# Patient Record
Sex: MALE | Race: WHITE | NOT HISPANIC OR LATINO | Employment: FULL TIME | URBAN - METROPOLITAN AREA
[De-identification: names, ages, dates, MRNs, and addresses within clinical notes are randomized per-mention and may not be internally consistent; named-entity substitution may affect disease eponyms.]

---

## 2019-04-30 ENCOUNTER — OFFICE VISIT (OUTPATIENT)
Dept: GASTROENTEROLOGY | Facility: CLINIC | Age: 59
End: 2019-04-30
Payer: COMMERCIAL

## 2019-04-30 VITALS — SYSTOLIC BLOOD PRESSURE: 124 MMHG | DIASTOLIC BLOOD PRESSURE: 82 MMHG | WEIGHT: 247.38 LBS | HEART RATE: 72 BPM

## 2019-04-30 DIAGNOSIS — K59.1 FUNCTIONAL DIARRHEA: ICD-10-CM

## 2019-04-30 DIAGNOSIS — R10.84 GENERALIZED ABDOMINAL PAIN: ICD-10-CM

## 2019-04-30 DIAGNOSIS — K58.0 IRRITABLE BOWEL SYNDROME WITH DIARRHEA: Primary | ICD-10-CM

## 2019-04-30 PROBLEM — R19.7 DIARRHEA: Status: ACTIVE | Noted: 2019-04-30

## 2019-04-30 PROCEDURE — 99244 OFF/OP CNSLTJ NEW/EST MOD 40: CPT | Performed by: INTERNAL MEDICINE

## 2019-04-30 RX ORDER — ALBUTEROL SULFATE 90 UG/1
2 AEROSOL, METERED RESPIRATORY (INHALATION)
COMMUNITY
Start: 2017-12-04

## 2019-04-30 RX ORDER — MULTIVITAMIN
CAPSULE ORAL
COMMUNITY

## 2019-04-30 RX ORDER — DICYCLOMINE HCL 20 MG
20 TABLET ORAL 3 TIMES DAILY PRN
Qty: 60 TABLET | Refills: 0 | Status: SHIPPED | OUTPATIENT
Start: 2019-04-30

## 2019-05-06 ENCOUNTER — TELEPHONE (OUTPATIENT)
Dept: GASTROENTEROLOGY | Facility: CLINIC | Age: 59
End: 2019-05-06

## 2019-05-19 ENCOUNTER — ANESTHESIA EVENT (OUTPATIENT)
Dept: GASTROENTEROLOGY | Facility: HOSPITAL | Age: 59
End: 2019-05-19

## 2019-05-20 ENCOUNTER — ANESTHESIA (OUTPATIENT)
Dept: GASTROENTEROLOGY | Facility: HOSPITAL | Age: 59
End: 2019-05-20

## 2019-05-20 ENCOUNTER — HOSPITAL ENCOUNTER (OUTPATIENT)
Dept: GASTROENTEROLOGY | Facility: HOSPITAL | Age: 59
Setting detail: OUTPATIENT SURGERY
Discharge: HOME | End: 2019-05-20
Attending: INTERNAL MEDICINE
Payer: COMMERCIAL

## 2019-05-20 VITALS
WEIGHT: 238.98 LBS | OXYGEN SATURATION: 96 % | SYSTOLIC BLOOD PRESSURE: 104 MMHG | RESPIRATION RATE: 19 BRPM | HEART RATE: 75 BPM | TEMPERATURE: 98.1 F | DIASTOLIC BLOOD PRESSURE: 74 MMHG | HEIGHT: 74 IN | BODY MASS INDEX: 30.67 KG/M2

## 2019-05-20 DIAGNOSIS — R10.84 GENERALIZED ABDOMINAL PAIN: ICD-10-CM

## 2019-05-20 DIAGNOSIS — K59.1 FUNCTIONAL DIARRHEA: ICD-10-CM

## 2019-05-20 DIAGNOSIS — K58.0 IRRITABLE BOWEL SYNDROME WITH DIARRHEA: ICD-10-CM

## 2019-05-20 PROCEDURE — 43239 EGD BIOPSY SINGLE/MULTIPLE: CPT | Performed by: INTERNAL MEDICINE

## 2019-05-20 PROCEDURE — 88305 TISSUE EXAM BY PATHOLOGIST: CPT | Performed by: PATHOLOGY

## 2019-05-20 PROCEDURE — 45380 COLONOSCOPY AND BIOPSY: CPT | Performed by: INTERNAL MEDICINE

## 2019-05-20 PROCEDURE — 45385 COLONOSCOPY W/LESION REMOVAL: CPT | Performed by: INTERNAL MEDICINE

## 2019-05-20 RX ORDER — SODIUM CHLORIDE, SODIUM LACTATE, POTASSIUM CHLORIDE, CALCIUM CHLORIDE 600; 310; 30; 20 MG/100ML; MG/100ML; MG/100ML; MG/100ML
125 INJECTION, SOLUTION INTRAVENOUS CONTINUOUS
Status: DISCONTINUED | OUTPATIENT
Start: 2019-05-20 | End: 2019-05-24 | Stop reason: HOSPADM

## 2019-05-20 RX ADMIN — LIDOCAINE HYDROCHLORIDE 20 MG: 20 INJECTION, SOLUTION INTRAVENOUS at 10:30

## 2019-05-20 RX ADMIN — LIDOCAINE HYDROCHLORIDE 20 MG: 20 INJECTION, SOLUTION INTRAVENOUS at 10:21

## 2019-05-20 RX ADMIN — LIDOCAINE HYDROCHLORIDE 30 MG: 20 INJECTION, SOLUTION INTRAVENOUS at 10:28

## 2019-05-20 RX ADMIN — LIDOCAINE HYDROCHLORIDE 130 MG: 20 INJECTION, SOLUTION INTRAVENOUS at 10:17

## 2019-05-20 RX ADMIN — SODIUM CHLORIDE, SODIUM LACTATE, POTASSIUM CHLORIDE, AND CALCIUM CHLORIDE 125 ML/HR: .6; .31; .03; .02 INJECTION, SOLUTION INTRAVENOUS at 09:44

## 2019-05-20 RX ADMIN — LIDOCAINE HYDROCHLORIDE 30 MG: 20 INJECTION, SOLUTION INTRAVENOUS at 10:25

## 2019-05-20 RX ADMIN — LIDOCAINE HYDROCHLORIDE 20 MG: 20 INJECTION, SOLUTION INTRAVENOUS at 10:22

## 2019-05-23 ENCOUNTER — TELEPHONE (OUTPATIENT)
Dept: GASTROENTEROLOGY | Facility: CLINIC | Age: 59
End: 2019-05-23

## 2019-07-03 RX ORDER — SILDENAFIL CITRATE 20 MG/1
TABLET ORAL
Refills: 5 | COMMUNITY
Start: 2019-05-17 | End: 2021-04-19 | Stop reason: ALTCHOICE

## 2019-07-08 ENCOUNTER — OFFICE VISIT (OUTPATIENT)
Dept: GASTROENTEROLOGY | Facility: CLINIC | Age: 59
End: 2019-07-08
Payer: COMMERCIAL

## 2019-07-08 VITALS
HEART RATE: 64 BPM | SYSTOLIC BLOOD PRESSURE: 122 MMHG | DIASTOLIC BLOOD PRESSURE: 84 MMHG | BODY MASS INDEX: 32 KG/M2 | WEIGHT: 249.25 LBS

## 2019-07-08 DIAGNOSIS — R10.12 LUQ PAIN: ICD-10-CM

## 2019-07-08 DIAGNOSIS — K58.2 IRRITABLE BOWEL SYNDROME WITH BOTH CONSTIPATION AND DIARRHEA: Primary | ICD-10-CM

## 2019-07-08 PROCEDURE — 99213 OFFICE O/P EST LOW 20 MIN: CPT | Performed by: PHYSICIAN ASSISTANT

## 2019-07-08 NOTE — PROGRESS NOTES
[facility] - Outpatient Follow-up Note  Camilo Mantel Apgar 64 y o  male MRN: [id number]  Encounter: [id number]          ASSESSMENT AND PLAN:      1  IBS with Alternating Constipation/Diarrhea  2  LUQ Pain  - S/P EGD/colonoscopy which were relatively normal except for diverticulosis and 1 tubular adenoma which was removed  - Recall colonoscopy in 3 years  - S/P xifaxan course, change in diet with increasing water/fiber intake, probiotic daily and his symptoms are significantly improved  - No alarm symptoms such as unintentional weight loss, BRBPR, fever, nausea, vomiting  - Bentyl PRN, pt rarely has to use this and reports his LUQ pain is very occasional and mild now  - Follow up in 2-3 months to ensure he has continued improvement, continue current regimen as above  ______________________________________________________________________    SUBJECTIVE:  Mr Apgar is a 65 yo M with a PMH of Afib s/p ablation, presenting for follow-up after his endoscopy and colonoscopy  He was initially seen at the end of April for several years but recent worsening of periumbilical and left upper quadrant pain associated with alternating constipation and diarrhea  His endoscopy and colonoscopy relatively normal except for diverticulosis which was mild, and 1 polyp which was a tubular adenoma  The biopsies taken were negative for celiac disease, H pylori  He is given a Xifaxan course and he altered his diet with drinking more water, reducing red meat intake, and eating more fruits and vegetables and fibrous foods  He is taking a probiotic every day  There is a fiber supplement that he tries to take but is not as consistent with this  He feels significantly better  He denies any further diarrhea  He also had periods softer stools alternating with mild constipation but overall things are much better and he is much happier  There is no unintentional weight loss     He has only use the dicyclomine handful of times and reports this does help the left upper quadrant pain he will get but this is very mild and happens very occasionally now  REVIEW OF SYSTEMS IS OTHERWISE NEGATIVE  Historical Information   Past Medical History:   Diagnosis Date    Atrial fibrillation ([address] )     H/O cardiac radiofrequency ablation      Past Surgical History:   Procedure Laterality Date    APPENDECTOMY      COLONOSCOPY      TONSILLECTOMY       Social History   Social History     Substance and Sexual Activity   Alcohol Use Yes    Frequency: Monthly or less    Drinks per session: 1 or 2    Binge frequency: Never     Social History     Substance and Sexual Activity   Drug Use Never     Social History     Tobacco Use   Smoking Status Never Smoker   Smokeless Tobacco Never Used     Family History   Problem Relation Age of Onset    Cancer Mother     Cancer Father     Diabetes Maternal Grandmother     Cancer Maternal Grandmother     Diabetes Paternal Grandmother     Cancer Paternal Grandmother     Cancer Paternal Grandfather        Meds/Allergies       Current Outpatient Medications:     albuterol (PROAIR HFA) 90 mcg/act inhaler    aspirin 81 MG tablet    dicyclomine (BENTYL) 20 mg tablet    Multiple Vitamin (MULTIVITAMIN) capsule    sildenafil (REVATIO) 20 mg tablet    No Known Allergies        Objective     Blood pressure 122/84, pulse 64, weight 113 kg (249 lb 4 oz)  Body mass index is 32 kg/m²  PHYSICAL EXAM:      General Appearance:   Alert, cooperative, no distress   HEENT:   Normocephalic, atraumatic, anicteric      Neck:  Supple, symmetrical, trachea midline   Lungs:   Clear to auscultation bilaterally; no rales, rhonchi or wheezing; respirations unlabored    Heart[de-identified]   Regular rate and rhythm; no murmur, rub, or gallop     Abdomen:   Soft, non-tender, non-distended; normal bowel sounds; no masses, no organomegaly    Genitalia:   Deferred    Rectal:   Deferred    Extremities:  No cyanosis, clubbing or edema  Pulses:  2+ and symmetric    Skin:  No jaundice, rashes, or lesions    Lymph nodes:  No palpable cervical lymphadenopathy        Lab Results:   No visits with results within 1 Day(s) from this visit  Latest known visit with results is:   Hospital Outpatient Visit on 05/20/2019   Component Date Value    Case Report 05/20/2019                      Value:Surgical Pathology Report                         Case: [id number]                                   Authorizing Provider:  Rafael Edmondson MD   Collected:           05/20/2019 1020              Ordering Location:      [address] [place] [address]       Received:            05/20/2019 [facility]                                                             Pathologist:           Tasha Hector MD                                                          Specimens:   A) - Duodenum                                                                                       B) - Stomach, stomach                                                                               C) - Large Intestine, Right/Ascending Colon                                                         D) - Polyp, Colorectal                                                                     Final Diagnosis 05/20/2019                      Value: This result contains rich text formatting which cannot be displayed here   Additional Information 05/20/2019                      Value: This result contains rich text formatting which cannot be displayed here   Synoptic Checklist 05/20/2019                      Value:  (COLON/RECTUM POLYP FORM-GI - All Specimens)                                                                                                                 : Adenoma(s)     Arvie Factor Description 05/20/2019                      Value: This result contains rich text formatting which cannot be displayed here      Clinical Information 05/20/2019 Value:Cold bx  R/o celiac disease    Case Report 05/20/2019                      Value:Surgical Pathology Report                         Case: [id number]                                   Authorizing Provider:  Jo Alexis MD   Collected:           05/20/2019 1020              Ordering Location:      [facility]       Received:            05/20/2019 [facility] Endoscopy                                                             Pathologist:           Meena Larkin MD                                                          Specimens:   A) - Duodenum                                                                                       B) - Stomach, stomach                                                                               C) - Large Intestine, Right/Ascending Colon                                                         D) - Polyp, Colorectal                                                                     Final Diagnosis 05/20/2019                      Value: This result contains rich text formatting which cannot be displayed here   Additional Information 05/20/2019                      Value: This result contains rich text formatting which cannot be displayed here   Synoptic Checklist 05/20/2019                      Value:  (COLON/RECTUM POLYP FORM-GI - All Specimens)                                                                                                                 : Adenoma(s)     Andrew Marie Description 05/20/2019                      Value: This result contains rich text formatting which cannot be displayed here   Clinical Information 05/20/2019                      Value:Cold bx  R/o celiac disease         Radiology Results:   No results found

## 2020-11-19 ENCOUNTER — OFFICE VISIT (OUTPATIENT)
Dept: UROLOGY | Facility: CLINIC | Age: 60
End: 2020-11-19
Payer: COMMERCIAL

## 2020-11-19 VITALS
SYSTOLIC BLOOD PRESSURE: 142 MMHG | RESPIRATION RATE: 18 BRPM | WEIGHT: 250 LBS | HEART RATE: 86 BPM | TEMPERATURE: 97.4 F | DIASTOLIC BLOOD PRESSURE: 74 MMHG | HEIGHT: 74 IN | BODY MASS INDEX: 32.08 KG/M2

## 2020-11-19 DIAGNOSIS — Z12.5 SCREENING FOR PROSTATE CANCER: ICD-10-CM

## 2020-11-19 DIAGNOSIS — R35.0 FREQUENCY OF URINATION: Primary | ICD-10-CM

## 2020-11-19 LAB
BACTERIA UR QL AUTO: NORMAL /HPF
BILIRUB UR QL STRIP: NEGATIVE
CLARITY UR: CLEAR
COLOR UR: YELLOW
GLUCOSE UR STRIP-MCNC: NEGATIVE MG/DL
HGB UR QL STRIP.AUTO: NEGATIVE
HYALINE CASTS #/AREA URNS LPF: NORMAL /LPF
KETONES UR STRIP-MCNC: NEGATIVE MG/DL
LEUKOCYTE ESTERASE UR QL STRIP: NEGATIVE
NITRITE UR QL STRIP: NEGATIVE
NON-SQ EPI CELLS URNS QL MICRO: NORMAL /HPF
PH UR STRIP.AUTO: 5.5 [PH]
POST-VOID RESIDUAL VOLUME, ML POC: 6 ML
PROT UR STRIP-MCNC: NEGATIVE MG/DL
RBC #/AREA URNS AUTO: NORMAL /HPF
SL AMB  POCT GLUCOSE, UA: NORMAL
SL AMB LEUKOCYTE ESTERASE,UA: NORMAL
SL AMB POCT BILIRUBIN,UA: NORMAL
SL AMB POCT BLOOD,UA: NORMAL
SL AMB POCT CLARITY,UA: CLEAR
SL AMB POCT COLOR,UA: YELLOW
SL AMB POCT KETONES,UA: NORMAL
SL AMB POCT NITRITE,UA: NORMAL
SL AMB POCT PH,UA: 5
SL AMB POCT SPECIFIC GRAVITY,UA: 1.01
SL AMB POCT URINE PROTEIN: NORMAL
SL AMB POCT UROBILINOGEN: 0.2
SP GR UR STRIP.AUTO: 1.02 (ref 1–1.03)
UROBILINOGEN UR QL STRIP.AUTO: 0.2 E.U./DL
WBC #/AREA URNS AUTO: NORMAL /HPF

## 2020-11-19 PROCEDURE — 99203 OFFICE O/P NEW LOW 30 MIN: CPT | Performed by: PHYSICIAN ASSISTANT

## 2020-11-19 PROCEDURE — 51798 US URINE CAPACITY MEASURE: CPT | Performed by: PHYSICIAN ASSISTANT

## 2020-11-19 PROCEDURE — 81001 URINALYSIS AUTO W/SCOPE: CPT | Performed by: PHYSICIAN ASSISTANT

## 2020-11-19 PROCEDURE — 81002 URINALYSIS NONAUTO W/O SCOPE: CPT | Performed by: PHYSICIAN ASSISTANT

## 2020-11-19 RX ORDER — ALLOPURINOL 100 MG/1
100 TABLET ORAL
COMMUNITY
Start: 2020-09-18

## 2020-11-19 RX ORDER — TAMSULOSIN HYDROCHLORIDE 0.4 MG/1
0.4 CAPSULE ORAL
Qty: 30 CAPSULE | Refills: 3 | Status: SHIPPED | OUTPATIENT
Start: 2020-11-19 | End: 2021-04-19 | Stop reason: SDUPTHER

## 2020-11-19 RX ORDER — PREDNISONE 10 MG/1
TABLET ORAL
COMMUNITY
Start: 2020-09-15

## 2021-02-22 ENCOUNTER — TELEPHONE (OUTPATIENT)
Dept: UROLOGY | Facility: CLINIC | Age: 61
End: 2021-02-22

## 2021-02-22 NOTE — TELEPHONE ENCOUNTER
Patient was scheduled to see La strickland on 02/25/2021 for a 3 month follow up with PSA prior to visit  Called to remind patient to get blood work done and patient asked if appointment can be rescheduled  Offered patient a virtual visit and he declined  Please get patient rescheduled  Thanks!

## 2021-04-13 ENCOUNTER — APPOINTMENT (OUTPATIENT)
Dept: LAB | Facility: CLINIC | Age: 61
End: 2021-04-13
Payer: COMMERCIAL

## 2021-04-13 ENCOUNTER — TRANSCRIBE ORDERS (OUTPATIENT)
Dept: LAB | Facility: CLINIC | Age: 61
End: 2021-04-13

## 2021-04-13 DIAGNOSIS — Z23 ENCOUNTER FOR IMMUNIZATION: ICD-10-CM

## 2021-04-13 DIAGNOSIS — Z12.5 SCREENING FOR PROSTATE CANCER: ICD-10-CM

## 2021-04-13 LAB — PSA SERPL-MCNC: 3.8 NG/ML (ref 0–4)

## 2021-04-13 PROCEDURE — 36415 COLL VENOUS BLD VENIPUNCTURE: CPT

## 2021-04-13 PROCEDURE — G0103 PSA SCREENING: HCPCS

## 2021-04-16 NOTE — PROGRESS NOTES
2021      Chief Complaint   Patient presents with    Urinary Frequency     Assessment and Plan    63 y o  male    1  BPH with LUTS  - Improved on Flomax, rx refilled  - AUA=11   - PVR=0 mL  - Uroflow: Peak flow=4 0, Average flow=2 9, Voided Volume=54 mL  - Consider cystoscopy and TRUS evaluation for surgical considerations  2  Prostate cancer screening  - Most recent PSA was 3 8 on 21  Previously 2 65 in 2018    - JEREMIAH unremarkable  - Given that this PSA is slightly elevated for patient's age range, would recommend repeating in 2 weeks  - If persistent elevation, may consider prostate biopsy  History of Present Illness  Kathyleen Flattery Apgar is a 63 y o  male here for follow up evaluation of BPH with LUTS and prostate cancer screening  Patient was started on Flomax at last office visit  He reports improvement in urinary symptoms  In regards to prostate cancer screening most recent PSA from 2021 was 3 8  Previously  PSA was 2 65 in 2018  Denies family history of prostate or urologic cancers  AUA SYMPTOM SCORE      Most Recent Value   AUA SYMPTOM SCORE   How often have you had a sensation of not emptying your bladder completely after you finished urinating? 3   How often have you had to urinate again less than two hours after you finished urinating? 2   How often have you found you stopped and started again several times when you urinate?  0   How often have you found it difficult to postpone urination? 1   How often have you had a weak urinary stream?  1   How often have you had to push or strain to begin urination? 1   How many times did you most typically get up to urinate from the time you went to bed at night until the time you got up in the morning?   3   Quality of Life: If you were to spend the rest of your life with your urinary condition just the way it is now, how would you feel about that?  3   AUA SYMPTOM SCORE  11          Review of Systems   Constitutional: Negative for chills and fever  Respiratory: Negative for shortness of breath  Cardiovascular: Negative for chest pain  Gastrointestinal: Negative for abdominal pain, constipation, diarrhea, nausea and vomiting  Genitourinary: Positive for frequency  Negative for difficulty urinating, dysuria, flank pain, hematuria and urgency  Allergic/Immunologic: Negative for immunocompromised state  Neurological: Negative for dizziness  Hematological: Does not bruise/bleed easily                    Past Medical History  Past Medical History:   Diagnosis Date    Atrial fibrillation [address] [address] )     H/O cardiac radiofrequency ablation        Past Social History  Past Surgical History:   Procedure Laterality Date    APPENDECTOMY      COLONOSCOPY      TONSILLECTOMY       Social History     Tobacco Use   Smoking Status Never Smoker   Smokeless Tobacco Never Used       Past Family History  Family History   Problem Relation Age of Onset    Cancer Mother     Cancer Father     Diabetes Maternal Grandmother     Cancer Maternal Grandmother     Diabetes Paternal Grandmother     Cancer Paternal Grandmother     Cancer Paternal Grandfather        Past Social history  Social History     Socioeconomic History    Marital status: /Civil Union     Spouse name: Not on file    Number of children: Not on file    Years of education: Not on file    Highest education level: Not on file   Occupational History    Not on file   Social Needs    Financial resource strain: Not on file    Food insecurity     Worry: Not on file     Inability: Not on file   [employer] needs     Medical: Not on file     Non-medical: Not on file   Tobacco Use    Smoking status: Never Smoker    Smokeless tobacco: Never Used   Substance and Sexual Activity    Alcohol use: Yes     Frequency: Monthly or less     Drinks per session: 1 or 2     Binge frequency: Never    Drug use: Never    Sexual activity: Not on file   Lifestyle    Physical activity Days per week: Not on file     Minutes per session: Not on file    Stress: Not on file   Relationships    Social connections     Talks on phone: Not on file     Gets together: Not on file     Attends Worship service: Not on file     Active member of club or organization: Not on file     Attends meetings of clubs or organizations: Not on file     Relationship status: Not on file    Intimate partner violence     Fear of current or ex partner: Not on file     Emotionally abused: Not on file     Physically abused: Not on file     Forced sexual activity: Not on file   Other Topics Concern    Not on file   Social History Narrative    Not on file       Current Medications  Current Outpatient Medications   Medication Sig Dispense Refill    albuterol (PROAIR HFA) 90 mcg/act inhaler Inhale 2 puffs      allopurinol (ZYLOPRIM) 100 mg tablet Take 100 mg by mouth      aspirin 81 MG tablet Take by mouth      Multiple Vitamin (MULTIVITAMIN) capsule Take by mouth      tamsulosin (FLOMAX) 0 4 mg Take 1 capsule (0 4 mg total) by mouth daily with dinner 30 capsule 3    dicyclomine (BENTYL) 20 mg tablet Take 1 tablet (20 mg total) by mouth 3 (three) times a day as needed (abdominal pain) 60 tablet 0    predniSONE 10 mg tablet Take 4 tabs for 2 days, 3 tabs for 2 days, 2 tabs for 2 days 1 tab for 2 days       No current facility-administered medications for this visit  Allergies  No Known Allergies      The following portions of the patient's history were reviewed and updated as appropriate: allergies, current medications, past medical history, past social history, past surgical history and problem list       Vitals  Vitals:    04/19/21 0926   BP: 126/78   Pulse: 82   Weight: 114 kg (251 lb 12 8 oz)   Height: 6' 2" (1 88 m)           Physical Exam  Physical Exam  Constitutional:       Appearance: Normal appearance  HENT:      Head: Normocephalic and atraumatic        Right Ear: External ear normal       Left Ear: External ear normal    Eyes:      General: No scleral icterus  Conjunctiva/sclera: Conjunctivae normal    Neck:      Musculoskeletal: Normal range of motion  Pulmonary:      Effort: Pulmonary effort is normal    Genitourinary:     Comments: Prostate approximately 30 g  Smooth, firm, symmetric without nodules or tenderness  Musculoskeletal: Normal range of motion  Skin:     General: Skin is warm and dry  Neurological:      General: No focal deficit present  Mental Status: He is alert and oriented to person, place, and time  Psychiatric:         Mood and Affect: Mood normal          Behavior: Behavior normal          Thought Content:  Thought content normal          Judgment: Judgment normal            Results  Recent Results (from the past 1 hour(s))   POCT Measure PVR    Collection Time: 04/19/21  9:29 AM   Result Value Ref Range    POST-VOID RESIDUAL VOLUME, ML POC 0 mL   ]  Lab Results   Component Value Date    PSA 3 8 04/13/2021     No results found for: GLUCOSE, CALCIUM, NA, K, CO2, CL, BUN, CREATININE  No results found for: WBC, HGB, HCT, MCV, PLT        Orders  Orders Placed This Encounter   Procedures    PSA, total and free     Standing Status:   Future     Standing Expiration Date:   4/19/2022    POCT Measure PVR       Dennis Bailey PA-C

## 2021-04-19 ENCOUNTER — OFFICE VISIT (OUTPATIENT)
Dept: UROLOGY | Facility: CLINIC | Age: 61
End: 2021-04-19
Payer: COMMERCIAL

## 2021-04-19 VITALS
WEIGHT: 251.8 LBS | HEIGHT: 74 IN | BODY MASS INDEX: 32.31 KG/M2 | HEART RATE: 82 BPM | DIASTOLIC BLOOD PRESSURE: 78 MMHG | SYSTOLIC BLOOD PRESSURE: 126 MMHG

## 2021-04-19 DIAGNOSIS — R35.0 FREQUENCY OF URINATION: Primary | ICD-10-CM

## 2021-04-19 DIAGNOSIS — R35.0 BENIGN PROSTATIC HYPERPLASIA WITH URINARY FREQUENCY: ICD-10-CM

## 2021-04-19 DIAGNOSIS — R97.20 ELEVATED PSA: ICD-10-CM

## 2021-04-19 DIAGNOSIS — N40.1 BENIGN PROSTATIC HYPERPLASIA WITH URINARY FREQUENCY: ICD-10-CM

## 2021-04-19 LAB — POST-VOID RESIDUAL VOLUME, ML POC: 0 ML

## 2021-04-19 PROCEDURE — 51798 US URINE CAPACITY MEASURE: CPT | Performed by: PHYSICIAN ASSISTANT

## 2021-04-19 PROCEDURE — 1036F TOBACCO NON-USER: CPT | Performed by: PHYSICIAN ASSISTANT

## 2021-04-19 PROCEDURE — 3008F BODY MASS INDEX DOCD: CPT | Performed by: PHYSICIAN ASSISTANT

## 2021-04-19 PROCEDURE — 99213 OFFICE O/P EST LOW 20 MIN: CPT | Performed by: PHYSICIAN ASSISTANT

## 2021-04-19 RX ORDER — TAMSULOSIN HYDROCHLORIDE 0.4 MG/1
0.4 CAPSULE ORAL
Qty: 30 CAPSULE | Refills: 3 | Status: SHIPPED | OUTPATIENT
Start: 2021-04-19 | End: 2022-04-14

## 2021-05-01 ENCOUNTER — IMMUNIZATIONS (OUTPATIENT)
Dept: FAMILY MEDICINE CLINIC | Facility: HOSPITAL | Age: 61
End: 2021-05-01

## 2021-05-01 DIAGNOSIS — Z23 ENCOUNTER FOR IMMUNIZATION: Primary | ICD-10-CM

## 2021-05-01 PROCEDURE — 91300 SARS-COV-2 / COVID-19 MRNA VACCINE (PFIZER-BIONTECH) 30 MCG: CPT

## 2021-05-01 PROCEDURE — 0001A SARS-COV-2 / COVID-19 MRNA VACCINE (PFIZER-BIONTECH) 30 MCG: CPT

## 2021-05-24 ENCOUNTER — IMMUNIZATIONS (OUTPATIENT)
Dept: FAMILY MEDICINE CLINIC | Facility: HOSPITAL | Age: 61
End: 2021-05-24

## 2021-05-24 DIAGNOSIS — Z23 ENCOUNTER FOR IMMUNIZATION: Primary | ICD-10-CM

## 2021-05-24 PROCEDURE — 0002A SARS-COV-2 / COVID-19 MRNA VACCINE (PFIZER-BIONTECH) 30 MCG: CPT

## 2021-05-24 PROCEDURE — 91300 SARS-COV-2 / COVID-19 MRNA VACCINE (PFIZER-BIONTECH) 30 MCG: CPT

## 2022-01-14 ENCOUNTER — IMMUNIZATIONS (OUTPATIENT)
Dept: FAMILY MEDICINE CLINIC | Facility: HOSPITAL | Age: 62
End: 2022-01-14

## 2022-01-14 DIAGNOSIS — Z23 ENCOUNTER FOR IMMUNIZATION: Primary | ICD-10-CM

## 2022-01-14 PROCEDURE — 0001A COVID-19 PFIZER VACC 0.3 ML: CPT

## 2022-01-14 PROCEDURE — 91300 COVID-19 PFIZER VACC 0.3 ML: CPT

## 2022-04-14 DIAGNOSIS — R35.0 BENIGN PROSTATIC HYPERPLASIA WITH URINARY FREQUENCY: ICD-10-CM

## 2022-04-14 DIAGNOSIS — N40.1 BENIGN PROSTATIC HYPERPLASIA WITH URINARY FREQUENCY: ICD-10-CM

## 2022-04-14 RX ORDER — TAMSULOSIN HYDROCHLORIDE 0.4 MG/1
CAPSULE ORAL
Qty: 30 CAPSULE | Refills: 3 | Status: SHIPPED | OUTPATIENT
Start: 2022-04-14

## 2022-08-30 ENCOUNTER — TELEPHONE (OUTPATIENT)
Dept: GASTROENTEROLOGY | Facility: CLINIC | Age: 62
End: 2022-08-30

## 2022-11-10 ENCOUNTER — OFFICE VISIT (OUTPATIENT)
Dept: DERMATOLOGY | Facility: CLINIC | Age: 62
End: 2022-11-10

## 2022-11-10 DIAGNOSIS — D48.9 NEOPLASM OF UNCERTAIN BEHAVIOR: Primary | ICD-10-CM

## 2022-11-10 NOTE — PROGRESS NOTES
[facility] [address] [facility]     Patient Name: Cyrus Jenkins Apgar  Encounter Date: 11/10/2022    Have you been cared for by a [place] [address] Dermatologist in the last 3 years and, if so, which description applies to you? NO  I am considered a "new" patient and must complete all patient intake questions  I am MALE/not capable of bearing children  REVIEW OF SYSTEMS:  Have you recently had or currently have any of the following? · Recent fever or chills? No  · Any non-healing wound? YES,    PAST MEDICAL HISTORY:  Have you personally ever had or currently have any of the following? If "YES," then please provide more detail  · Skin cancer (such as Melanoma, Basal Cell Carcinoma, Squamous Cell Carcinoma? No  · Tuberculosis, HIV/AIDS, Hepatitis B or C: No  · Systemic Immunosuppression such as Diabetes, Biologic or Immunotherapy, Chemotherapy, Organ Transplantation, Bone Marrow Transplantation No  · Radiation Treatment No   FAMILY HISTORY:  Any "first degree relatives" (parent, brother, sister, or child) with the following? • Skin Cancer, Pancreatic or Other Cancer? No   PATIENT EXPERIENCE:    • Do you want the Dermatologist to perform a COMPLETE skin exam today including a clinical examination under the "bra and underwear" areas? NO  • If necessary, do we have your permission to call and leave a detailed message on your Preferred Phone number that includes your specific medical information?   Yes      No Known Allergies   Current Outpatient Medications:   •  albuterol (PROVENTIL HFA,VENTOLIN HFA) 90 mcg/act inhaler, Inhale 2 puffs, Disp: , Rfl:   •  allopurinol (ZYLOPRIM) 100 mg tablet, Take 100 mg by mouth, Disp: , Rfl:   •  Multiple Vitamin (MULTIVITAMIN) capsule, Take by mouth, Disp: , Rfl:   •  tamsulosin (FLOMAX) 0 4 mg, TAKE 1 CAPSULE BY MOUTH EVERY DAY WITH DINNER, Disp: 30 capsule, Rfl: 3  •  aspirin 81 MG tablet, Take by mouth, Disp: , Rfl:   •  dicyclomine (BENTYL) 20 mg tablet, Take 1 tablet (20 mg total) by mouth 3 (three) times a day as needed (abdominal pain), Disp: 60 tablet, Rfl: 0  •  predniSONE 10 mg tablet, Take 4 tabs for 2 days, 3 tabs for 2 days, 2 tabs for 2 days 1 tab for 2 days, Disp: , Rfl:           • Whom besides the patient is providing clinical information about today's encounter?   o NO ADDITIONAL HISTORIAN (patient alone provided history)    Physical Exam and Assessment/Plan by Diagnosis:    NEOPLASM OF UNCERTAIN BEHAVIOR OF SKIN    Physical Exam:  • (Anatomic Location); (Size and Morphological Description); (Differential Diagnosis):  o Left cheek; 1 2 cm crusted pink plaque; diffdx; basal cell   • Pertinent Positives:  • Pertinent Negatives: Additional History of Present Condition:  Patient had got stuck with a branch about 1 year ago lesion never healed and started growing  Assessment and Plan:  • I have discussed with the patient that a sample of skin via a "skin biopsy” would be potentially helpful to further make a specific diagnosis under the microscope  • Based on a thorough discussion of this condition and the management approach to it (including a comprehensive discussion of the known risks, side effects and potential benefits of treatment), the patient (family) agrees to implement the following specific plan:    o Procedure:  Skin Biopsy  After a thorough discussion of treatment options and risk/benefits/alternatives (including but not limited to local pain, scarring, dyspigmentation, blistering, possible superinfection, and inability to confirm a diagnosis via histopathology), verbal and written consent were obtained and portion of the rash was biopsied for tissue sample  See below for consent that was obtained from patient and subsequent Procedure Note    PROCEDURE TANGENTIAL (SHAVE) BIOPSY NOTE:    • Performing Physician: Pat Del Rosario   • Anatomic Location; Clinical Description with size (cm); Pre-Op Diagnosis:   o Left cheek; 1 2 cm crusted pink plaque; diffdx; basal cell   • Post-op diagnosis: Same     • Local anesthesia: 1% Lidocaine HCL     • Topical anesthesia: None    • Hemostasis: Electrocautery       After obtaining informed consent  at which time there was a discussion about the purpose of biopsy  and low risks of infection and bleeding  The area was prepped and draped in the usual fashion  Anesthesia was obtained with 1% lidocaine with epinephrine  A shave biopsy to an appropriate sampling depth was obtained by Shave (Dermablade or 15 blade) The resulting wound was covered with surgical ointment and bandaged appropriately  The patient tolerated the procedure well without complications and was without signs of functional compromise  Specimen has been sent for review by Dermatopathology  Standard post-procedure care has been explained and has been included in written form within the patient's copy of Informed Consent  INFORMED CONSENT DISCUSSION AND POST-OPERATIVE INSTRUCTIONS FOR PATIENT    I   RATIONALE FOR PROCEDURE  I understand that a skin biopsy allows the Dermatologist to test a lesion or rash under the microscope to obtain a diagnosis  It usually involves numbing the area with numbing medication and removing a small piece of skin; sometimes the area will be closed with sutures  In this specific procedure, sutures are not usually needed  If any sutures are placed, then they are usually need to be removed in 2 weeks or less  I understand that my Dermatologist recommends that a skin "shave" biopsy be performed today  A local anesthetic, similar to the kind that a dentist uses when filling a cavity, will be injected with a very small needle into the skin area to be sampled  The injected skin and tissue underneath "will go to sleep” and become numb so no pain should be felt afterwards    An instrument shaped like a tiny "razor blade" (shave biopsy instrument) will be used to cut a small piece of tissue and skin from the area so that a sample of tissue can be taken and examined more closely under the microscope  A slight amount of bleeding will occur, but it will be stopped with direct pressure and a pressure bandage and any other appropriate methods  I understands that a scar will form where the wound was created  Surgical ointment will be applied to help protect the wound  Sutures are not usually needed  II   RISKS AND POTENTIAL COMPLICATIONS   I understand the risks and potential complications of a skin biopsy include but are not limited to the following:  • Bleeding  • Infection  • Pain  • Scar/keloid  • Skin discoloration  • Incomplete Removal  • Recurrence  • Nerve Damage/Numbness/Loss of Function  • Allergic Reaction to Anesthesia  • Biopsies are diagnostic procedures and based on findings additional treatment or evaluation may be required  • Loss or destruction of specimen resulting in no additional findings    My Dermatologist has explained to me the nature of the condition, the nature of the procedure, and the benefits to be reasonably expected compared with alternative approaches  My Dermatologist has discussed the likelihood of major risks or complications of this procedure including the specific risks listed above, such as bleeding, infection, and scarring/keloid  I understand that a scar is expected after this procedure  I understand that my physician cannot predict if the scar will form a "keloid," which extends beyond the borders of the wound that is created  A keloid is a thick, painful, and bumpy scar  A keloid can be difficult to treat, as it does not always respond well to therapy, which includes injecting cortisone directly into the keloid every few weeks  While this usually reduces the pain and size of the scar, it does not eliminate it  I understand that photographs may be taken before and after the procedure    These will be maintained as part of the medical providers confidential records and may not be made available to me  I further authorize the medical provider to use the photographs for teaching purposes or to illustrate scientific papers, books, or lectures if in his/her judgment, medical research, education, or science may benefit from its use  I have had an opportunity to fully inquire about the risks and benefits of this procedure and its alternatives  I have been given ample time and opportunity to ask questions and to seek a second opinion if I wished to do so  I acknowledge that there have specifically been no guarantees as to the cosmetic results from the procedure  I am aware that with any procedure there is always the possibility of an unexpected complication  III  POST-PROCEDURAL CARE (WHAT YOU WILL NEED TO DO "AFTER THE BIOPSY" TO OPTIMIZE HEALING)    • Keep the area clean and dry  Try NOT to remove the bandage or get it wet for the first 24 hours  • Gently clean the area and apply surgical ointment (such as Vaseline petrolatum ointment, which is available "over the counter" and not a prescription) to the biopsy site for up to 2 weeks straight  This acts to protect the wound from the outside world  • Sutures are not usually placed in this procedure  If any sutures were placed, return for suture removal as instructed (generally 1 week for the face, 2 weeks for the body)  • Take Acetaminophen (Tylenol) for discomfort, if no contraindications  Ibuprofen or aspirin could make bleeding worse  • Call our office immediately for signs of infection: fever, chills, increased redness, warmth, tenderness, discomfort/pain, or pus or foul smell coming from the wound  WHAT TO DO IF THERE IS ANY BLEEDING? If a small amount of bleeding is noticed, place a clean cloth over the area and apply firm pressure for ten minutes  Check the wound after 10 minutes of direct pressure  If bleeding persists, try one more time for an additional 10 minutes of direct pressure on the area    If the bleeding becomes heavier or does not stop after the second attempt, or if you have any other questions about this procedure, then please call your [address] [place] [address]'s Dermatologist by calling [phone] (SKIN)  I hereby acknowledge that I have reviewed and verified the site with my Dermatologist and have requested and authorized my Dermatologist to proceed with the procedure        Scribe Attestation    I,:  Alisia Morgan MA am acting as a scribe while in the presence of the attending physician :       I,:  Adamaris Jay MD personally performed the services described in this documentation    as scribed in my presence :

## 2022-11-10 NOTE — PATIENT INSTRUCTIONS
Assessment and Plan:  I have discussed with the patient that a sample of skin via a "skin biopsy” would be potentially helpful to further make a specific diagnosis under the microscope  Based on a thorough discussion of this condition and the management approach to it (including a comprehensive discussion of the known risks, side effects and potential benefits of treatment), the patient (family) agrees to implement the following specific plan:    Procedure:  Skin Biopsy  After a thorough discussion of treatment options and risk/benefits/alternatives (including but not limited to local pain, scarring, dyspigmentation, blistering, possible superinfection, and inability to confirm a diagnosis via histopathology), verbal and written consent were obtained and portion of the rash was biopsied for tissue sample  See below for consent that was obtained from patient and subsequent Procedure Note  I   RATIONALE FOR PROCEDURE  I understand that a skin biopsy allows the Dermatologist to test a lesion or rash under the microscope to obtain a diagnosis  It usually involves numbing the area with numbing medication and removing a small piece of skin; sometimes the area will be closed with sutures  In this specific procedure, sutures are not usually needed  If any sutures are placed, then they are usually need to be removed in 2 weeks or less  I understand that my Dermatologist recommends that a skin "shave" biopsy be performed today  A local anesthetic, similar to the kind that a dentist uses when filling a cavity, will be injected with a very small needle into the skin area to be sampled  The injected skin and tissue underneath "will go to sleep” and become numb so no pain should be felt afterwards    An instrument shaped like a tiny "razor blade" (shave biopsy instrument) will be used to cut a small piece of tissue and skin from the area so that a sample of tissue can be taken and examined more closely under the microscope  A slight amount of bleeding will occur, but it will be stopped with direct pressure and a pressure bandage and any other appropriate methods  I understands that a scar will form where the wound was created  Surgical ointment will be applied to help protect the wound  Sutures are not usually needed  II   RISKS AND POTENTIAL COMPLICATIONS   I understand the risks and potential complications of a skin biopsy include but are not limited to the following:  Bleeding  Infection  Pain  Scar/keloid  Skin discoloration  Incomplete Removal  Recurrence  Nerve Damage/Numbness/Loss of Function  Allergic Reaction to Anesthesia  Biopsies are diagnostic procedures and based on findings additional treatment or evaluation may be required  Loss or destruction of specimen resulting in no additional findings    My Dermatologist has explained to me the nature of the condition, the nature of the procedure, and the benefits to be reasonably expected compared with alternative approaches  My Dermatologist has discussed the likelihood of major risks or complications of this procedure including the specific risks listed above, such as bleeding, infection, and scarring/keloid  I understand that a scar is expected after this procedure  I understand that my physician cannot predict if the scar will form a "keloid," which extends beyond the borders of the wound that is created  A keloid is a thick, painful, and bumpy scar  A keloid can be difficult to treat, as it does not always respond well to therapy, which includes injecting cortisone directly into the keloid every few weeks  While this usually reduces the pain and size of the scar, it does not eliminate it  I understand that photographs may be taken before and after the procedure  These will be maintained as part of the medical providers confidential records and may not be made available to me    I further authorize the medical provider to use the photographs for teaching purposes or to illustrate scientific papers, books, or lectures if in his/her judgment, medical research, education, or science may benefit from its use  I have had an opportunity to fully inquire about the risks and benefits of this procedure and its alternatives  I have been given ample time and opportunity to ask questions and to seek a second opinion if I wished to do so  I acknowledge that there have specifically been no guarantees as to the cosmetic results from the procedure  I am aware that with any procedure there is always the possibility of an unexpected complication  III  POST-PROCEDURAL CARE (WHAT YOU WILL NEED TO DO "AFTER THE BIOPSY" TO OPTIMIZE HEALING)    Keep the area clean and dry  Try NOT to remove the bandage or get it wet for the first 24 hours  Gently clean the area and apply surgical ointment (such as Vaseline petrolatum ointment, which is available "over the counter" and not a prescription) to the biopsy site for up to 2 weeks straight  This acts to protect the wound from the outside world  Sutures are not usually placed in this procedure  If any sutures were placed, return for suture removal as instructed (generally 1 week for the face, 2 weeks for the body)  Take Acetaminophen (Tylenol) for discomfort, if no contraindications  Ibuprofen or aspirin could make bleeding worse  Call our office immediately for signs of infection: fever, chills, increased redness, warmth, tenderness, discomfort/pain, or pus or foul smell coming from the wound  WHAT TO DO IF THERE IS ANY BLEEDING? If a small amount of bleeding is noticed, place a clean cloth over the area and apply firm pressure for ten minutes  Check the wound after 10 minutes of direct pressure  If bleeding persists, try one more time for an additional 10 minutes of direct pressure on the area    If the bleeding becomes heavier or does not stop after the second attempt, or if you have any other questions about this procedure, then please call your [facility] D[facility]atologist by calling [phone] (SKIN)  I hereby acknowledge that I have reviewed and verified the site with my Dermatologist and have requested and authorized my Dermatologist to proceed with the procedure

## 2022-11-29 ENCOUNTER — TELEPHONE (OUTPATIENT)
Dept: DERMATOLOGY | Facility: CLINIC | Age: 62
End: 2022-11-29

## 2022-11-29 DIAGNOSIS — C44.319 BASAL CELL CARCINOMA (BCC) OF SKIN OF OTHER PART OF FACE: Primary | ICD-10-CM

## 2022-11-29 NOTE — TELEPHONE ENCOUNTER
DERMATOPATHOLOGY RESULT NOTE    Results reviewed by ordering physician  Called patient to personally discuss results  Discussed results with patient  Instructions for Clinical Derm Team:   (remember to route Result Note to appropriate staff):    Call patient and schedule for Mohs on left cheek  Mohs referral placed  Result & Plan by Specimen:    Specimen A: malignant  Plan: MOHs    Case Report  Surgical Pathology Report                         Case: [id number]                                   Authorizing Provider: Samuel Castaneda MD            Collected:           11/10/2022 1141              Ordering Location:     [facility] Received:            11/10/2022 1141                                     Gap                                                                          Pathologist:           Deanna Gordon MD                                                         Specimen:    Skin, Other, A: left cheek                                                               Final Diagnosis  A  Skin lesion, A: left cheek, shave biopsy:  - Basal cell carcinoma, nodular type with infiltrating features, extending to the peripheral border     and base of biopsy

## 2022-11-29 NOTE — TELEPHONE ENCOUNTER
Pt called in and said he got his biopsy results through his MyChart  I let pt know that the pt receives it before the doctor and that the doctor will call the pt to discuss the results as soon as she can

## 2022-12-12 ENCOUNTER — TELEPHONE (OUTPATIENT)
Dept: DERMATOLOGY | Facility: CLINIC | Age: 62
End: 2022-12-12

## 2022-12-12 NOTE — TELEPHONE ENCOUNTER
Patient called left a message stating he has tried calling 6 times to schedule for mohs and has not received anything back

## 2022-12-15 NOTE — TELEPHONE ENCOUNTER
Pre- operative Mohs Telephone Scheduling Note    Do you have a pacemaker or defibrillator? no    Do you take antibiotics before skin or dental procedures? no  If yes, will likely require pre-operative antibiotics  Ask  the patient why they take the antibiotics (usually because of joint replacement)  Do you have a history of a joint replacements within the past 2 years? no   If yes, will likely require pre-operative antibiotics  Ask if orthopaedic surgeon has prescribed pre-operative antibiotics to take before procedures/dental work? Do you take any OTC medications that thin your blood (Aspirin, Aleve, Ibuprofen) or supplements that thin your blood (fish oil, garlic, vitamin E, Ginko Biloba)? no    Do you take any prescribed medications that thin your blood (Coumadin, Plavix, Xarelto, Eliquis or another prescribed blood thinner)? no    Do you have an allergy to lidocaine or epinephrine? no    Do you have an allergy to shellfish? no    Do you smoke? no      If yes,  patient to try and stop 2 days before surgery and 7 days after the surgery  Minimizing smoking as much as possible during this time will improve healing and the cosmetic result after surgery  Date scheduled: 2/20 @ 1130 AM WITH  [address] with other provider (Yosef Bose, ENT) required? no, OFFERED TO PATIENT AND HE DECLINED  IF YES, PLEASE FORWARD TO APPROPRIATE PERSONNEL TO HELP COORDINATE  Are there remaining tumors to be scheduled? no    Was Prior Authorization obtained?  No (please use  [email or web address] to document prior auth)

## 2022-12-19 DIAGNOSIS — R97.20 ELEVATED PSA: Primary | ICD-10-CM

## 2022-12-20 ENCOUNTER — TELEPHONE (OUTPATIENT)
Dept: UROLOGY | Facility: CLINIC | Age: 62
End: 2022-12-20

## 2022-12-20 NOTE — TELEPHONE ENCOUNTER
[address] [facility] [facility] Clerical 31 minutes ago (8:06 AM)     TR  Please assist in scheduling f/u  Psa in chart      [facility] Clinical 23 hours ago (8:54 AM)     Yes should get another PSA ptv  Order placed         Note          You routed conversation to [facility] Provider 4 days ago     You 4 days ago     LV  pts medication was refused as he has not been seen since 2021 -   His last PSA was done 3/23/22  And was 5 04   Should he have another prior to next appointment?          Note          Sridhar Humphrey PA-C routed conversation to  [facility] [address] 4 days ago     Sridhar Humphrey PA-C 4 days ago     Has not been seen since 4/2021         Note   [see HPI] : see HPI [Negative] : Musculoskeletal

## 2023-01-03 DIAGNOSIS — R35.0 BENIGN PROSTATIC HYPERPLASIA WITH URINARY FREQUENCY: ICD-10-CM

## 2023-01-03 DIAGNOSIS — N40.1 BENIGN PROSTATIC HYPERPLASIA WITH URINARY FREQUENCY: ICD-10-CM

## 2023-01-03 RX ORDER — TAMSULOSIN HYDROCHLORIDE 0.4 MG/1
CAPSULE ORAL
Qty: 30 CAPSULE | Refills: 3 | Status: SHIPPED | OUTPATIENT
Start: 2023-01-03

## 2023-01-17 DIAGNOSIS — R35.0 BENIGN PROSTATIC HYPERPLASIA WITH URINARY FREQUENCY: ICD-10-CM

## 2023-01-17 DIAGNOSIS — N40.1 BENIGN PROSTATIC HYPERPLASIA WITH URINARY FREQUENCY: ICD-10-CM

## 2023-01-17 RX ORDER — TAMSULOSIN HYDROCHLORIDE 0.4 MG/1
CAPSULE ORAL
Qty: 90 CAPSULE | Refills: 2 | Status: SHIPPED | OUTPATIENT
Start: 2023-01-17

## 2023-02-03 ENCOUNTER — APPOINTMENT (OUTPATIENT)
Dept: LAB | Facility: CLINIC | Age: 63
End: 2023-02-03

## 2023-02-03 DIAGNOSIS — R97.20 ELEVATED PSA: ICD-10-CM

## 2023-02-04 LAB
PSA FREE MFR SERPL: 15.5 %
PSA FREE SERPL-MCNC: 0.9 NG/ML
PSA SERPL-MCNC: 5.8 NG/ML (ref 0–4)

## 2023-02-06 NOTE — PROGRESS NOTES
2023      Chief Complaint   Patient presents with   • Elevated PSA   • Benign Prostatic Hypertrophy     Assessment and Plan    1  Elevated PSA  - Most recent PSA from 2/3/23 was 5 8  - Previously PSA from 3/2022 was also elevated at 5 04  - Previously PSA normal at 3 8 in   - JEREMIAH benign    - Recommed mpMRI for further evaluation  Patient agreeable  If any positive findings, would utilize MRI for a targeted prostate biopsy  If negative, will plan to monitor PSA closely  2  BPH with LUTS  - Well managed on Flomax  - PVR - 9 mL    - Follow up after completion of imaging  History of Present Illness  Quay Feil Apgar is a 63 y o  male here for follow up evaluation of BPH and elevated PSA  Patient was last seen in   He was seen to have a PSA of 3 8 at that time  He was advised on closer interval PSA check however was subsequently lost to follow-up  Recent PSAs have been elevated in the 5 range, most recently at 5 8  He has no family history of  malignancy  Patient also has previously been seen for lower urinary tract symptoms and has been managed on Flomax monotherapy with good benefit  He denies any new urinary complaints  Denies any family history of  malignancy  Review of Systems   Constitutional: Negative for chills and fever  Respiratory: Negative for shortness of breath  Cardiovascular: Negative for chest pain  Gastrointestinal: Negative for abdominal pain  Genitourinary: Negative for difficulty urinating, dysuria, flank pain, frequency, hematuria and urgency  Neurological: Negative for dizziness             AUA SYMPTOM SCORE    Flowsheet Row Most Recent Value   AUA SYMPTOM SCORE    How often have you had a sensation of not emptying your bladder completely after you finished urinating? 2 (P)     How often have you had to urinate again less than two hours after you finished urinating? 1 (P)     How often have you found you stopped and started again several times when you urinate? 1 (P)     How often have you found it difficult to postpone urination? 0 (P)     How often have you had a weak urinary stream? 1 (P)     How often have you had to push or strain to begin urination? 0 (P)     How many times did you most typically get up to urinate from the time you went to bed at night until the time you got up in the morning? 2 (P)     Quality of Life: If you were to spend the rest of your life with your urinary condition just the way it is now, how would you feel about that? 2 (P)     AUA SYMPTOM SCORE 7 (P)              Past Medical History  Past Medical History:   Diagnosis Date   • Atrial fibrillation ([facility] [address] )    • H/O cardiac radiofrequency ablation        Past Social History  Past Surgical History:   Procedure Laterality Date   • APPENDECTOMY     • COLONOSCOPY     • TONSILLECTOMY       Social History     Tobacco Use   Smoking Status Never   Smokeless Tobacco Never       Past Family History  Family History   Problem Relation Age of Onset   • Cancer Mother    • Cancer Father    • Diabetes Maternal Grandmother    • Cancer Maternal Grandmother    • Diabetes Paternal Grandmother    • Cancer Paternal Grandmother    • Cancer Paternal Grandfather        Past Social history  Social History     Socioeconomic History   • Marital status: /Civil Union     Spouse name: Not on file   • Number of children: Not on file   • Years of education: Not on file   • Highest education level: Not on file   Occupational History   • Not on file   Tobacco Use   • Smoking status: Never   • Smokeless tobacco: Never   Substance and Sexual Activity   • Alcohol use:  Yes   • Drug use: Never   • Sexual activity: Not on file   Other Topics Concern   • Not on file   Social History Narrative   • Not on file     Social Determinants of Health     Financial Resource Strain: Not on file   Food Insecurity: Not on file   Transportation Needs: Not on file   Physical Activity: Not on file   Stress: Not on file   Social Connections: Not on file   Intimate Partner Violence: Not on file   Housing Stability: Not on file       Current Medications  Current Outpatient Medications   Medication Sig Dispense Refill   • allopurinol (ZYLOPRIM) 100 mg tablet Take 100 mg by mouth     • Multiple Vitamin (MULTIVITAMIN) capsule Take by mouth     • tamsulosin (FLOMAX) 0 4 mg TAKE 1 CAPSULE BY MOUTH EVERY DAY WITH DINNER 90 capsule 2     No current facility-administered medications for this visit  Allergies  No Known Allergies      The following portions of the patient's history were reviewed and updated as appropriate: allergies, current medications, past medical history, past social history, past surgical history and problem list       Vitals  Vitals:    02/07/23 1119   BP: 122/84   BP Location: Left arm   Patient Position: Sitting   Cuff Size: Standard   Weight: 108 kg (238 lb)   Height: 6' 2" (1 88 m)           Physical Exam  Physical Exam  Constitutional:       Appearance: Normal appearance  HENT:      Head: Normocephalic and atraumatic  Right Ear: External ear normal       Left Ear: External ear normal       Nose: Nose normal    Eyes:      General: No scleral icterus  Conjunctiva/sclera: Conjunctivae normal    Cardiovascular:      Pulses: Normal pulses  Pulmonary:      Effort: Pulmonary effort is normal    Genitourinary:     Comments: Prostate approx 25 g without nodules or tenderness  Musculoskeletal:         General: Normal range of motion  Cervical back: Normal range of motion  Skin:     General: Skin is warm and dry  Neurological:      General: No focal deficit present  Mental Status: He is alert and oriented to person, place, and time  Psychiatric:         Mood and Affect: Mood normal          Behavior: Behavior normal          Thought Content:  Thought content normal          Judgment: Judgment normal            Results  Recent Results (from the past 1 hour(s))   POCT Measure PVR    Collection Time: 02/07/23 11:22 AM   Result Value Ref Range    POST-VOID RESIDUAL VOLUME, ML POC 9 mL   ]  Lab Results   Component Value Date    PSA 5 8 (H) 02/03/2023    PSA 3 8 04/13/2021     No results found for: GLUCOSE, CALCIUM, NA, K, CO2, CL, BUN, CREATININE  No results found for: WBC, HGB, HCT, MCV, PLT        Orders  Orders Placed This Encounter   Procedures   • POCT Measure PVR     Alton Click

## 2023-02-07 ENCOUNTER — OFFICE VISIT (OUTPATIENT)
Dept: UROLOGY | Facility: CLINIC | Age: 63
End: 2023-02-07

## 2023-02-07 ENCOUNTER — OFFICE VISIT (OUTPATIENT)
Dept: DERMATOLOGY | Facility: CLINIC | Age: 63
End: 2023-02-07

## 2023-02-07 VITALS
DIASTOLIC BLOOD PRESSURE: 84 MMHG | HEIGHT: 74 IN | SYSTOLIC BLOOD PRESSURE: 122 MMHG | BODY MASS INDEX: 30.54 KG/M2 | WEIGHT: 238 LBS

## 2023-02-07 VITALS — BODY MASS INDEX: 29.52 KG/M2 | WEIGHT: 230 LBS | HEIGHT: 74 IN

## 2023-02-07 DIAGNOSIS — N40.1 BENIGN PROSTATIC HYPERPLASIA WITH URINARY FREQUENCY: Primary | ICD-10-CM

## 2023-02-07 DIAGNOSIS — R97.20 ELEVATED PSA: ICD-10-CM

## 2023-02-07 DIAGNOSIS — L85.3 XEROSIS OF SKIN: ICD-10-CM

## 2023-02-07 DIAGNOSIS — R35.0 BENIGN PROSTATIC HYPERPLASIA WITH URINARY FREQUENCY: Primary | ICD-10-CM

## 2023-02-07 DIAGNOSIS — D22.5 MULTIPLE BENIGN MELANOCYTIC NEVI OF UPPER AND LOWER EXTREMITIES AND TRUNK: Primary | ICD-10-CM

## 2023-02-07 DIAGNOSIS — D18.01 CHERRY ANGIOMA: ICD-10-CM

## 2023-02-07 DIAGNOSIS — D22.70 MULTIPLE BENIGN MELANOCYTIC NEVI OF UPPER AND LOWER EXTREMITIES AND TRUNK: Primary | ICD-10-CM

## 2023-02-07 DIAGNOSIS — L81.4 LENTIGO: ICD-10-CM

## 2023-02-07 DIAGNOSIS — L82.1 SEBORRHEIC KERATOSES: ICD-10-CM

## 2023-02-07 DIAGNOSIS — D22.60 MULTIPLE BENIGN MELANOCYTIC NEVI OF UPPER AND LOWER EXTREMITIES AND TRUNK: Primary | ICD-10-CM

## 2023-02-07 DIAGNOSIS — C44.310 BASAL CELL CARCINOMA (BCC) OF SKIN OF FACE, UNSPECIFIED PART OF FACE: ICD-10-CM

## 2023-02-07 LAB — POST-VOID RESIDUAL VOLUME, ML POC: 9 ML

## 2023-02-07 NOTE — PATIENT INSTRUCTIONS
1  MELANOCYTIC NEVI ("Moles")  Assessment and Plan:  Based on a thorough discussion of this condition and the management approach to it (including a comprehensive discussion of the known risks, side effects and potential benefits of treatment), the patient (family) agrees to implement the following specific plan:  When outside we recommend using a wide brim hat, sunglasses, long sleeve and pants, sunscreen with SPF 51+ with reapplication every 2 hours, or SPF specific clothing   Benign, reassured  Annual skin check     Melanocytic Nevi  Melanocytic nevi ("moles") are tan or brown, raised or flat areas of the skin which have an increased number of melanocytes  Melanocytes are the cells in our body which make pigment and account for skin color  Some moles are present at birth (I e , "congenital nevi"), while others come up later in life (i e , "acquired nevi")  The sun can stimulate the body to make more moles  Sunburns are not the only thing that triggers more moles  Chronic sun exposure can do it too  Clinically distinguishing a healthy mole from melanoma may be difficult, even for experienced dermatologists  The "ABCDE's" of moles have been suggested as a means of helping to alert a person to a suspicious mole and the possible increased risk of melanoma  The suggestions for raising alert are as follows:    Asymmetry: Healthy moles tend to be symmetric, while melanomas are often asymmetric  Asymmetry means if you draw a line through the mole, the two halves do not match in color, size, shape, or surface texture  Asymmetry can be a result of rapid enlargement of a mole, the development of a raised area on a previously flat lesion, scaling, ulceration, bleeding or scabbing within the mole  Any mole that starts to demonstrate "asymmetry" should be examined promptly by a board certified dermatologist      Border: Healthy moles tend to have discrete, even borders    The border of a melanoma often blends into the normal skin and does not sharply delineate the mole from normal skin  Any mole that starts to demonstrate "uneven borders" should be examined promptly by a board certified dermatologist      Color: Healthy moles tend to be one color throughout  Melanomas tend to be made up of different colors ranging from dark black, blue, white, or red  Any mole that demonstrates a color change should be examined promptly by a board certified dermatologist      Diameter: Healthy moles tend to be smaller than 0 6 cm in size; an exception are "congenital nevi" that can be larger  Melanomas tend to grow and can often be greater than 0 6 cm (1/4 of an inch, or the size of a pencil eraser)  This is only a guideline, and many normal moles may be larger than 0 6 cm without being unhealthy  Any mole that starts to change in size (small to bigger or bigger to smaller) should be examined promptly by a board certified dermatologist      Evolving: Healthy moles tend to "stay the same "  Melanomas may often show signs of change or evolution such as a change in size, shape, color, or elevation  Any mole that starts to itch, bleed, crust, burn, hurt, or ulcerate or demonstrate a change or evolution should be examined promptly by a board certified dermatologist         2  LENTIGO  Assessment and Plan:  Based on a thorough discussion of this condition and the management approach to it (including a comprehensive discussion of the known risks, side effects and potential benefits of treatment), the patient (family) agrees to implement the following specific plan:  When outside we recommend using a wide brim hat, sunglasses, long sleeve and pants, sunscreen with SPF 22+ with reapplication every 2 hours, or SPF specific clothing       What is a lentigo? A lentigo is a pigmented flat or slightly raised lesion with a clearly defined edge  Unlike an ephelis (freckle), it does not fade in the winter months   There are several kinds of lentigo  The name lentigo originally referred to its appearance resembling a small lentil  The plural of lentigo is lentigines, although “lentigos” is also in common use  Who gets lentigines? Lentigines can affect males and females of all ages and races  Solar lentigines are especially prevalent in fair skinned adults  Lentigines associated with syndromes are present at birth or arise during childhood  What causes lentigines? Common forms of lentigo are due to exposure to ultraviolet radiation:  Sun damage including sunburn   Indoor tanning   Phototherapy, especially photochemotherapy (PUVA)    Ionizing radiation, eg radiation therapy, can also cause lentigines  Several familial syndromes associated with widespread lentigines originate from mutations in Carlito-MAP kinase, mTOR signaling and PTEN pathways  What is the treatment for lentigines? Most lentigines are left alone  Attempts to lighten them may not be successful  The following approaches are used:  SPF 50+ broad-spectrum sunscreen   Hydroquinone bleaching cream   Alpha hydroxy acids   Vitamin C   Retinoids   Azelaic acid   Chemical peels  Individual lesions can be permanently removed using:  Cryotherapy   Intense pulsed light   Pigment lasers    How can lentigines be prevented? Lentigines associated with exposure ultraviolet radiation can be prevented by very careful sun protection  Clothing is more successful at preventing new lentigines than are sunscreens  What is the outlook for lentigines? Lentigines usually persist  They may increase in number with age and sun exposure  Some in sun-protected sites may fade and disappear      3  HOOD ANGIOMAS  Assessment and Plan:  Based on a thorough discussion of this condition and the management approach to it (including a comprehensive discussion of the known risks, side effects and potential benefits of treatment), the patient (family) agrees to implement the following specific plan:  Monitor for changes  Benign, reassured    Assessment and Plan:    Cherry angioma, also known as Tucker Tre spots, are benign vascular skin lesions  A "cherry angioma" is a firm red, blue or purple papule, 0 1-1 cm in diameter  When thrombosed, they can appear black in colour until evaluated with a dermatoscope when the red or purple colour is more easily seen  Cherry angioma may develop on any part of the body but most often appear on the scalp, face, lips and trunk  An angioma is due to proliferating endothelial cells; these are the cells that line the inside of a blood vessel  Angiomas can arise in early life or later in life; the most common type of angioma is a cherry angioma  Cherry angiomas are very common in males and females of any age or race  They are more noticeable in white skin than in skin of colour  They markedly increase in number from about the age of 36  There may be a family history of similar lesions  Eruptive cherry angiomas have been rarely reported to be associated with internal malignancy  The cause of angiomas is unknown  Genetic analysis of cherry angiomas has shown that they frequently carry specific somatic missense mutations in the GNAQ and GNA11 (Q209H) genes, which are involved in other vascular and melanocytic proliferations  4  SEBORRHEIC KERATOSIS; NON-INFLAMED  Assessment and Plan:  Based on a thorough discussion of this condition and the management approach to it (including a comprehensive discussion of the known risks, side effects and potential benefits of treatment), the patient (family) agrees to implement the following specific plan:  Monitor for changes  Benign, reassured    Seborrheic Keratosis  A seborrheic keratosis is a harmless warty spot that appears during adult life as a common sign of skin aging  Seborrheic keratoses can arise on any area of skin, covered or uncovered, with the usual exception of the palms and soles  They do not arise from mucous membranes  Seborrheic keratoses can have highly variable appearance  Seborrheic keratoses are extremely common  It has been estimated that over 90% of adults over the age of 61 years have one or more of them  They occur in males and females of all races, typically beginning to erupt in the 35s or 45s  They are uncommon under the age of 21 years  The precise cause of seborrhoeic keratoses is not known  Seborrhoeic keratoses are considered degenerative in nature  As time goes by, seborrheic keratoses tend to become more numerous  Some people inherit a tendency to develop a very large number of them; some people may have hundreds of them  There is no easy way to remove multiple lesions on a single occasion  Unless a specific lesion is "inflamed" and is causing pain or stinging/burning or is bleeding, most insurance companies do not authorize treatment  5  XEROSIS ("DRY SKIN")  Assessment and Plan:  Based on a thorough discussion of this condition and the management approach to it (including a comprehensive discussion of the known risks, side effects and potential benefits of treatment), the patient (family) agrees to implement the following specific plan:  Use moisturizer like Eucerin,Cerave or Aveeno Cream 3 times a day for the dry skin            Dry skin refers to skin that feels dry to touch  Dry skin has a dull surface with a rough, scaly quality  The skin is less pliable and cracked  When dryness is severe, the skin may become inflamed and fissured  Although any body site can be dry, dry skin tends to affect the shins more than any other site  Dry skin is lacking moisture in the outer horny cell layer (stratum corneum) and this results in cracks in the skin surface  Dry skin is also called xerosis, xeroderma or asteatosis (lack of fat)  It can affect males and females of all ages  There is some racial variability in water and lipid content of the skin    Dry skin that starts in early childhood may be one of about 20 types of ichthyosis (fish-scale skin)  There is often a family history of dry skin  Dry skin is commonly seen in people with atopic dermatitis  Nearly everyone > 60 years has dry skin  Dry skin that begins later may be seen in people with certain diseases and conditions  Postmenopausal women  Hypothyroidism  Chronic renal disease   Malnutrition and weight loss   Subclinical dermatitis   Treatment with certain drugs such as oral retinoids, diuretics and epidermal growth factor receptor inhibitors      What is the treatment for dry skin? The mainstay of treatment of dry skin and ichthyosis is moisturisers/emollients  They should be applied liberally and often enough to:  Reduce itch   Improve the barrier function   Prevent entry of irritants, bacteria   Reduce transepidermal water loss  How can dry skin be prevented? Eliminate aggravating factors:  Reduce the frequency of bathing  A humidifier in winter and air conditioner in summer   Compare having a short shower with a prolonged soak in a bath  Use lukewarm, not hot, water  Replace standard soap with a substitute such as a synthetic detergent cleanser, water-miscible emollient, bath oil, anti-pruritic tar oil, colloidal oatmeal etc    Apply an emollient liberally and often, particularly shortly after bathing, and when itchy  The drier the skin, the thicker this should be, especially on the hands  What is the outlook for dry skin? A tendency to dry skin may persist life-long, or it may improve once contributing factors are controlled  6  BASAL CELL CARCINOMA  What is basal cell carcinoma? Basal cell carcinoma (BCC) is a common, locally invasive, keratinocytic, or non-melanoma, skin cancer  It is also known as rodent ulcer and basalioma  Patients with BCC often develop multiple primary tumours over time  Who gets basal cell carcinoma?   Risk factors for BCC include:  Age and sex: BCCs are particularly prevalent in elderly males  However, they also affect females and younger adults   Previous BCC or other form of skin cancer (squamous cell carcinoma, melanoma)   Sun damage (photoaging, actinic keratoses)   Repeated prior episodes of sunburn   Fair skin, blue eyes and blond or red hair--note; BCC can also affect darker skin types   Previous cutaneous injury, thermal burn, disease (eg cutaneous lupus, sebaceous naevus)   Inherited syndromes: BCC is a particular problem for families with basal cell naevus syndrome (Gorlin syndrome), Zcatq-Dqufé-Ydryqwkj syndrome, Rombo syndrome, Oley syndrome and xeroderma pigmentosum   Other risk factors include ionising radiation, exposure to arsenic, and immune suppression due to disease or medicines    What causes basal cell carcinoma? The cause of BCC is multifactorial   Most often, there are DNA mutations in the patched [facility]) tumour suppressor gene, part of hedgehog signalling pathway   These may be triggered by exposure to ultraviolet radiation   Various spontaneous and inherited gene defects predispose to [facility]    What are the clinical features of basal cell carcinoma? BCC is a locally invasive skin tumour  The main characteristics are:  Slowly growing plaque or nodule   Skin coloured, pink or pigmented   Varies in size from a few millimetres to several centimetres in diameter   Spontaneous bleeding or ulceration  BCC is very rarely a threat to life  A tiny proportion of BCCs grow rapidly, invade deeply, and/or metastasise to local lymph nodes  Types of basal cell carcinoma  There are several distinct clinical types of BCC, and over 20 histological growth patterns of BCC    Nodular BCC  Most common type of facial BCC   Shiny or pearly nodule with a smooth surface   May have central depression or ulceration, so its edges appear rolled   Blood vessels cross its surface   Cystic variant is soft, with jelly-like contents   Micronodular, microcystic and infiltrative types are potentially aggressive subtypes   Also known as nodulocystic carcinoma  Superficial BCC  Most common type in younger adults   Most common type on upper trunk and shoulders   Slightly scaly, irregular plaque   Thin, translucent rolled border   Multiple microerosions  Morphoeaform BCC  Usually found in mid-facial sites   Waxy, scar-like plaque with indistinct borders   Wide and deep subclinical extension   May infiltrate cutaneous nerves (perineural spread)   Also known as morpheic, morphoeiform or sclerosing BCC  Basosquamous carcinoma  Mixed basal cell carcinoma (BCC) and squamous cell carcinoma (SCC)   Infiltrative growth pattern   Potentially more aggressive than other forms of BCC   Also known as basisquamous carcinoma and mixed basal-squamous cell carcinoma       Complications of basal cell carcinoma    Recurrent BCC  Recurrence of BCC after initial treatment is not uncommon  Characteristics of recurrent BCC often include: Incomplete excision or narrow margins at primary excision   Morphoeic, micronodular, and infiltrative subtypes   Location on head and neck    Advanced BCC  Advanced BCCs are large, often neglected tumours  They may be several centimetres in diameter   They may be deeply infiltrating into tissues below the skin   They are difficult or impossible to treat surgically    Metastatic BCC  Very rare   Primary tumour is often large, neglected or recurrent, located on head and neck, with aggressive subtype   May have had multiple prior treatments   May arise in site exposed to ionising radiation   Can be fatal    How is basal cell carcinoma diagnosed? BCC is diagnosed clinically by the presence of a slowly enlarging skin lesion with typical appearance  The diagnosis and  by a diagnostic biopsy or following excision  Some typical superficial BCCs on trunk and limbs are clinically diagnosed and have non-surgical treatment without histology  What is the treatment for primary basal cell carcinoma?   The treatment for a [address] depends on its type, size and location, the number to be treated, patient factors, and the preference or expertise of the doctor  Most BCCs are treated surgically  Long-term follow-up is recommended to check for new lesions and recurrence; the latter may be unnecessary if histology has reported wide clear margins  Excision biopsy  Excision means the lesion is cut out and the skin stitched up  Most appropriate treatment for nodular, infiltrative and morphoeic BCCs   Should include 3 to 5 mm margin of normal skin around the tumour   Very large lesions may require flap or skin graft to repair the defect   Pathologist will report deep and lateral margins   Further surgery is recommended for lesions that are incompletely excised    Mohs micrographically controlled excision  Mohs micrographically controlled surgery involves examining carefully marked excised tissue under the microscope, layer by layer, to ensure complete excision  Very high cure rates achieved by trained Mohs surgeons   Used in high-risk areas of the face around eyes, lips and nose   Suitable for ill-defined, morphoeic, infiltrative and recurrent subtypes   Large defects are repaired by flap or skin graft    Superficial skin surgery  Superficial skin surgery comprises shave, curettage, and electrocautery  It is a rapid technique using local anaesthesia and does not require sutures  Suitable for small, well-defined nodular or superficial BCCs   Lesions are usually located on trunk or limbs   Wound is left open to heal by secondary intention   Moist wound dressings lead to healing within a few weeks   Eventual scar quality variable    Cryotherapy  Cryotherapy is the treatment of a superficial skin lesion by freezing it, usually with liquid nitrogen    Suitable for small superficial BCCs on covered areas of trunk and limbs   Best avoided for BCCs on head and neck, and distal to knees   Double freeze-thaw technique   Results in a blister that crusts over and heals within several weeks  Leaves permanent white janis    Photodynamic therapy  Photodynamic therapy (PDT) refers to a technique in which [facility] is treated with a photosensitising chemical, and exposed to light several hours later  Topical photosensitisers include aminolevulinic acid lotion and methyl aminolevulinate cream   Suitable for low-risk small, superficial BCCs   Best avoided if tumour in site at high risk of recurrence   Results in inflammatory reaction, maximal 3-4 days after procedure   Treatment repeated 7 days after initial treatment   Excellent cosmetic results    Imiquimod cream  Imiquimod is an immune response modifier  Best used for superficial BCCs less than 2 cm diameter   Applied three to five times each week, for 6-16 weeks   Results in a variable inflammatory reaction, maximal at three weeks   Minimal scarring is usual    Fluorouracil cream  5-Fluorouracil cream is a topical cytotoxic agent  Used to treat small superficial basal cell carcinomas   Requires prolonged course, eg twice daily for 6-12 weeks   Causes inflammatory reaction   Has high recurrence rates    Radiotherapy  Radiotherapy or X-ray treatment can be used to treat primary BCCs or as adjunctive treatment if margins are incomplete  Mainly used if surgery is not suitable   Best avoided in young patients and in genetic conditions predisposing to skin cancer   Best cosmetic results achieved using multiple fractions   Typically, patient attends once-weekly for several weeks   Causes inflammatory reaction followed by scar   Risk of radiodermatitis, late recurrence, and new tumours    What is the treatment for advanced or metastatic basal cell carcinoma? Locally advanced primary, recurrent or metastatic BCC requires multidisciplinary consultation  Often a combination of treatments is used  Surgery   Radiotherapy   Targeted therapy  Targeted therapy refers to the hedgehog signalling pathway inhibitors, vismodegib and sonidegib  These drugs have some important risks and side effects  How can basal cell carcinoma be prevented? The most important way to prevent BCC is to avoid sunburn  This is especially important in childhood and early life  Fair skinned individuals and those with a personal or family history of BCC should protect their skin from sun exposure daily, year-round and lifelong  Stay indoors or under the shade in the middle of the day   Wear covering clothing   Apply high protection factor SPF50+ broad-spectrum sunscreens generously to exposed skin if outdoors   Avoid indoor tanning (sun beds, solaria)  Oral nicotinamide (vitamin B3) in a dose of 500 mg twice daily may reduce the number and severity of BCCs  What is the outlook for basal cell carcinoma? Most BCCs are cured by treatment  Cure is most likely if treatment is undertaken when the lesion is small  About 50% of people with BCC develop a second one within 3 years of the first  They are also at increased risk of other skin cancers, especially melanoma  Regular self-skin examinations and long-term annual skin checks by an experienced health professional are recommended

## 2023-02-07 NOTE — PROGRESS NOTES
[facility] [address] [facility] Note     Patient Name: Lamont Lah Apgar  Encounter Date: 2023     Have you been cared for by a [place] [address] [facility]ologist in the last 3 years and, if so, which description applies to you? Yes  I have been here within the last 3 years, and my medical history has NOT changed since that time  I am MALE/not capable of bearing children  REVIEW OF SYSTEMS:  Have you recently had or currently have any of the following? · No changes in my recent health  PAST MEDICAL HISTORY:  Have you personally ever had or currently have any of the following? If "YES," then please provide more detail  · No changes in my medical history  FAMILY HISTORY:  Any "first degree relatives" (parent, brother, sister, or child) with the following? • No changes in my family's known health  PATIENT EXPERIENCE:    • Do you want the Dermatologist to perform a COMPLETE skin exam today including a clinical examination under the "bra and underwear" areas? Yes  • If necessary, do we have your permission to call and leave a detailed message on your Preferred Phone number that includes your specific medical information?   Yes      No Known Allergies   Current Outpatient Medications:   •  allopurinol (ZYLOPRIM) 100 mg tablet, Take 100 mg by mouth, Disp: , Rfl:   •  Multiple Vitamin (MULTIVITAMIN) capsule, Take by mouth, Disp: , Rfl:   •  tamsulosin (FLOMAX) 0 4 mg, TAKE 1 CAPSULE BY MOUTH EVERY DAY WITH DINNER, Disp: 90 capsule, Rfl: 2  •  albuterol (PROVENTIL HFA,VENTOLIN HFA) 90 mcg/act inhaler, Inhale 2 puffs, Disp: , Rfl:   •  aspirin 81 MG tablet, Take by mouth, Disp: , Rfl:   •  dicyclomine (BENTYL) 20 mg tablet, Take 1 tablet (20 mg total) by mouth 3 (three) times a day as needed (abdominal pain), Disp: 60 tablet, Rfl: 0  •  predniSONE 10 mg tablet, Take 4 tabs for 2 days, 3 tabs for 2 days, 2 tabs for 2 days 1 tab for 2 days, Disp: , Rfl:           • Whom besides the patient is providing clinical information about today's encounter?   o NO ADDITIONAL HISTORIAN (patient alone provided history)    Physical Exam and Assessment/Plan by Diagnosis:    1  MELANOCYTIC NEVI ("Moles")    Physical Exam:  • Anatomic Location Affected:   Mostly on sun-exposed areas of the trunk and extremities  • Morphological Description:  Scattered, 1-4mm round to ovoid, symmetrical-appearing, even bordered, skin colored to dark brown macules/papules, mostly in sun-exposed areas  • Pertinent Positives:  • Pertinent Negatives:    Assessment and Plan:  Based on a thorough discussion of this condition and the management approach to it (including a comprehensive discussion of the known risks, side effects and potential benefits of treatment), the patient (family) agrees to implement the following specific plan:  • When outside we recommend using a wide brim hat, sunglasses, long sleeve and pants, sunscreen with SPF 44+ with reapplication every 2 hours, or SPF specific clothing   • Benign, reassured  • Annual skin check     Melanocytic Nevi  Melanocytic nevi ("moles") are tan or brown, raised or flat areas of the skin which have an increased number of melanocytes  Melanocytes are the cells in our body which make pigment and account for skin color  Some moles are present at birth (I e , "congenital nevi"), while others come up later in life (i e , "acquired nevi")  The sun can stimulate the body to make more moles  Sunburns are not the only thing that triggers more moles  Chronic sun exposure can do it too  Clinically distinguishing a healthy mole from melanoma may be difficult, even for experienced dermatologists  The "ABCDE's" of moles have been suggested as a means of helping to alert a person to a suspicious mole and the possible increased risk of melanoma  The suggestions for raising alert are as follows:    Asymmetry: Healthy moles tend to be symmetric, while melanomas are often asymmetric    Asymmetry means if you draw a line through the mole, the two halves do not match in color, size, shape, or surface texture  Asymmetry can be a result of rapid enlargement of a mole, the development of a raised area on a previously flat lesion, scaling, ulceration, bleeding or scabbing within the mole  Any mole that starts to demonstrate "asymmetry" should be examined promptly by a board certified dermatologist      Border: Healthy moles tend to have discrete, even borders  The border of a melanoma often blends into the normal skin and does not sharply delineate the mole from normal skin  Any mole that starts to demonstrate "uneven borders" should be examined promptly by a board certified dermatologist      Color: Healthy moles tend to be one color throughout  Melanomas tend to be made up of different colors ranging from dark black, blue, white, or red  Any mole that demonstrates a color change should be examined promptly by a board certified dermatologist      Diameter: Healthy moles tend to be smaller than 0 6 cm in size; an exception are "congenital nevi" that can be larger  Melanomas tend to grow and can often be greater than 0 6 cm (1/4 of an inch, or the size of a pencil eraser)  This is only a guideline, and many normal moles may be larger than 0 6 cm without being unhealthy  Any mole that starts to change in size (small to bigger or bigger to smaller) should be examined promptly by a board certified dermatologist      Evolving: Healthy moles tend to "stay the same "  Melanomas may often show signs of change or evolution such as a change in size, shape, color, or elevation    Any mole that starts to itch, bleed, crust, burn, hurt, or ulcerate or demonstrate a change or evolution should be examined promptly by a board certified dermatologist         2  LENTIGO    Physical Exam:  • Anatomic Location Affected:  Shoulders   • Morphological Description:  Light brown macules  • Pertinent Positives:  • Pertinent Negatives:    Assessment and Plan:  Based on a thorough discussion of this condition and the management approach to it (including a comprehensive discussion of the known risks, side effects and potential benefits of treatment), the patient (family) agrees to implement the following specific plan:  • When outside we recommend using a wide brim hat, sunglasses, long sleeve and pants, sunscreen with SPF 55+ with reapplication every 2 hours, or SPF specific clothing       What is a lentigo? A lentigo is a pigmented flat or slightly raised lesion with a clearly defined edge  Unlike an ephelis (freckle), it does not fade in the winter months  There are several kinds of lentigo  The name lentigo originally referred to its appearance resembling a small lentil  The plural of lentigo is lentigines, although “lentigos” is also in common use  Who gets lentigines? Lentigines can affect males and females of all ages and races  Solar lentigines are especially prevalent in fair skinned adults  Lentigines associated with syndromes are present at birth or arise during childhood  What causes lentigines? Common forms of lentigo are due to exposure to ultraviolet radiation:  • Sun damage including sunburn   • Indoor tanning   • Phototherapy, especially photochemotherapy (PUVA)    Ionizing radiation, eg radiation therapy, can also cause lentigines  Several familial syndromes associated with widespread lentigines originate from mutations in Carlito-MAP kinase, mTOR signaling and PTEN pathways  What is the treatment for lentigines? Most lentigines are left alone  Attempts to lighten them may not be successful  The following approaches are used:  • SPF 50+ broad-spectrum sunscreen   • Hydroquinone bleaching cream   • Alpha hydroxy acids   • Vitamin C   • Retinoids   • Azelaic acid   • Chemical peels  Individual lesions can be permanently removed using:  • Cryotherapy   • Intense pulsed light   • Pigment lasers    How can lentigines be prevented?   Lentigines associated with exposure ultraviolet radiation can be prevented by very careful sun protection  Clothing is more successful at preventing new lentigines than are sunscreens  What is the outlook for lentigines? Lentigines usually persist  They may increase in number with age and sun exposure  Some in sun-protected sites may fade and disappear  3  HOOD ANGIOMAS    Physical Exam:  • Anatomic Location Affected:  trunk  • Morphological Description:  Scattered cherry red, 1-4 mm papules  • Pertinent Positives:  • Pertinent Negatives:    Assessment and Plan:  Based on a thorough discussion of this condition and the management approach to it (including a comprehensive discussion of the known risks, side effects and potential benefits of treatment), the patient (family) agrees to implement the following specific plan:  • Monitor for changes  • Benign, reassured    Assessment and Plan:    Cherry angioma, also known as [place] spots, are benign vascular skin lesions  A "cherry angioma" is a firm red, blue or purple papule, 0 1-1 cm in diameter  When thrombosed, they can appear black in colour until evaluated with a dermatoscope when the red or purple colour is more easily seen  Cherry angioma may develop on any part of the body but most often appear on the scalp, face, lips and trunk  An angioma is due to proliferating endothelial cells; these are the cells that line the inside of a blood vessel  Angiomas can arise in early life or later in life; the most common type of angioma is a cherry angioma  Cherry angiomas are very common in males and females of any age or race  They are more noticeable in white skin than in skin of colour  They markedly increase in number from about the age of 36  There may be a family history of similar lesions  Eruptive cherry angiomas have been rarely reported to be associated with internal malignancy  The cause of angiomas is unknown   Genetic analysis of cherry angiomas has shown that they frequently carry specific somatic missense mutations in the GNAQ and GNA11 (Q209H) genes, which are involved in other vascular and melanocytic proliferations  4  SEBORRHEIC KERATOSIS; NON-INFLAMED    Physical Exam:  • Anatomic Location Affected:  trunk  • Morphological Description:  Flat and raised, waxy, smooth to warty textured, yellow to brownish-grey to dark brown to blackish, discrete, "stuck-on" appearing papules  • Pertinent Positives:  • Pertinent Negatives:    Assessment and Plan:  Based on a thorough discussion of this condition and the management approach to it (including a comprehensive discussion of the known risks, side effects and potential benefits of treatment), the patient (family) agrees to implement the following specific plan:  • Monitor for changes  • Benign, reassured    Seborrheic Keratosis  A seborrheic keratosis is a harmless warty spot that appears during adult life as a common sign of skin aging  Seborrheic keratoses can arise on any area of skin, covered or uncovered, with the usual exception of the palms and soles  They do not arise from mucous membranes  Seborrheic keratoses can have highly variable appearance  Seborrheic keratoses are extremely common  It has been estimated that over 90% of adults over the age of 61 years have one or more of them  They occur in males and females of all races, typically beginning to erupt in the 35s or 45s  They are uncommon under the age of 21 years  The precise cause of seborrhoeic keratoses is not known  Seborrhoeic keratoses are considered degenerative in nature  As time goes by, seborrheic keratoses tend to become more numerous  Some people inherit a tendency to develop a very large number of them; some people may have hundreds of them  There is no easy way to remove multiple lesions on a single occasion    Unless a specific lesion is "inflamed" and is causing pain or stinging/burning or is bleeding, most insurance companies do not authorize treatment  5  XEROSIS ("DRY SKIN")    Physical Exam:  • Anatomic Location Affected:  diffuse  • Morphological Description:  xerosis  • Pertinent Positives:  • Pertinent Negatives:    Assessment and Plan:  Based on a thorough discussion of this condition and the management approach to it (including a comprehensive discussion of the known risks, side effects and potential benefits of treatment), the patient (family) agrees to implement the following specific plan:  • Use moisturizer like Eucerin,Cerave or Aveeno Cream 3 times a day for the dry skin   •   •    •     Dry skin refers to skin that feels dry to touch  Dry skin has a dull surface with a rough, scaly quality  The skin is less pliable and cracked  When dryness is severe, the skin may become inflamed and fissured  Although any body site can be dry, dry skin tends to affect the shins more than any other site  Dry skin is lacking moisture in the outer horny cell layer (stratum corneum) and this results in cracks in the skin surface  Dry skin is also called xerosis, xeroderma or asteatosis (lack of fat)  It can affect males and females of all ages  There is some racial variability in water and lipid content of the skin  • Dry skin that starts in early childhood may be one of about 20 types of ichthyosis (fish-scale skin)  There is often a family history of dry skin  • Dry skin is commonly seen in people with atopic dermatitis  • Nearly everyone > 60 years has dry skin  Dry skin that begins later may be seen in people with certain diseases and conditions  • Postmenopausal women  • Hypothyroidism  • Chronic renal disease   • Malnutrition and weight loss   • Subclinical dermatitis   • Treatment with certain drugs such as oral retinoids, diuretics and epidermal growth factor receptor inhibitors      What is the treatment for dry skin?   The mainstay of treatment of dry skin and ichthyosis is moisturisers/emollients  They should be applied liberally and often enough to:  • Reduce itch   • Improve the barrier function   • Prevent entry of irritants, bacteria   • Reduce transepidermal water loss  How can dry skin be prevented? Eliminate aggravating factors:  • Reduce the frequency of bathing  • A humidifier in winter and air conditioner in summer   • Compare having a short shower with a prolonged soak in a bath  • Use lukewarm, not hot, water  • Replace standard soap with a substitute such as a synthetic detergent cleanser, water-miscible emollient, bath oil, anti-pruritic tar oil, colloidal oatmeal etc    • Apply an emollient liberally and often, particularly shortly after bathing, and when itchy  The drier the skin, the thicker this should be, especially on the hands  What is the outlook for dry skin? A tendency to dry skin may persist life-long, or it may improve once contributing factors are controlled  6  BASAL CELL CARCINOMA    Physical Exam:  • Anatomic Location Affected:  Left cheek  • Morphological Description:  Pink plaque   • Pertinent Positives:  • Pertinent Negatives:  • Prior biopsy: yes;  If YES, prior accession or case #: [id number]     Assessment and Plan:  Scheduled for mohs in April     What is basal cell carcinoma? Basal cell carcinoma (BCC) is a common, locally invasive, keratinocytic, or non-melanoma, skin cancer  It is also known as rodent ulcer and basalioma  Patients with BCC often develop multiple primary tumours over time  Who gets basal cell carcinoma? Risk factors for BCC include:  • Age and sex: BCCs are particularly prevalent in elderly males   However, they also affect females and younger adults   • Previous BCC or other form of skin cancer (squamous cell carcinoma, melanoma)   • Sun damage (photoaging, actinic keratoses)   • Repeated prior episodes of sunburn   • Fair skin, blue eyes and blond or red hair--note; BCC can also affect darker skin types • Previous cutaneous injury, thermal burn, disease (eg cutaneous lupus, sebaceous naevus)   • Inherited syndromes: BCC is a particular problem for families with basal cell naevus syndrome (Gorlin syndrome), Bniif-Xftvé-Iiwvfufi syndrome, Rombo syndrome, Oley syndrome and xeroderma pigmentosum   • Other risk factors include ionising radiation, exposure to arsenic, and immune suppression due to disease or medicines    What causes basal cell carcinoma? The cause of BCC is multifactorial   • Most often, there are DNA mutations in the patched [facility] tumour suppressor gene, part of hedgehog signalling pathway   • These may be triggered by exposure to ultraviolet radiation   • Various spontaneous and inherited gene defects predispose to [facility]    What are the clinical features of basal cell carcinoma? BCC is a locally invasive skin tumour  The main characteristics are:  • Slowly growing plaque or nodule   • Skin coloured, pink or pigmented   • Varies in size from a few millimetres to several centimetres in diameter   • Spontaneous bleeding or ulceration  BCC is very rarely a threat to life  A tiny proportion of BCCs grow rapidly, invade deeply, and/or metastasise to local lymph nodes  Types of basal cell carcinoma  There are several distinct clinical types of BCC, and over 20 histological growth patterns of BCC    Nodular BCC  • Most common type of facial BCC   • Shiny or pearly nodule with a smooth surface   • May have central depression or ulceration, so its edges appear rolled   • Blood vessels cross its surface   • Cystic variant is soft, with jelly-like contents   • Micronodular, microcystic and infiltrative types are potentially aggressive subtypes   • Also known as nodulocystic carcinoma  Superficial BCC  • Most common type in younger adults   • Most common type on upper trunk and shoulders   • Slightly scaly, irregular plaque   • Thin, translucent rolled border   • Multiple microerosions  Morphoeaform BCC  • Usually found in mid-facial sites   • Waxy, scar-like plaque with indistinct borders   • Wide and deep subclinical extension   • May infiltrate cutaneous nerves (perineural spread)   • Also known as morpheic, morphoeiform or sclerosing BCC  Basosquamous carcinoma  • Mixed basal cell carcinoma (BCC) and squamous cell carcinoma (SCC)   • Infiltrative growth pattern   • Potentially more aggressive than other forms of BCC   • Also known as basisquamous carcinoma and mixed basal-squamous cell carcinoma       Complications of basal cell carcinoma    Recurrent BCC  Recurrence of BCC after initial treatment is not uncommon  Characteristics of recurrent BCC often include:  o Incomplete excision or narrow margins at primary excision   o Morphoeic, micronodular, and infiltrative subtypes   o Location on head and neck    Advanced BCC  Advanced BCCs are large, often neglected tumours  o They may be several centimetres in diameter   o They may be deeply infiltrating into tissues below the skin   o They are difficult or impossible to treat surgically    Metastatic BCC  o Very rare   o Primary tumour is often large, neglected or recurrent, located on head and neck, with aggressive subtype   o May have had multiple prior treatments   o May arise in site exposed to ionising radiation   o Can be fatal    How is basal cell carcinoma diagnosed? BCC is diagnosed clinically by the presence of a slowly enlarging skin lesion with typical appearance  The diagnosis and  by a diagnostic biopsy or following excision  Some typical superficial BCCs on trunk and limbs are clinically diagnosed and have non-surgical treatment without histology  What is the treatment for primary basal cell carcinoma? The treatment for a [address] depends on its type, size and location, the number to be treated, patient factors, and the preference or expertise of the doctor  Most BCCs are treated surgically   Long-term follow-up is recommended to check for new lesions and recurrence; the latter may be unnecessary if histology has reported wide clear margins  Excision biopsy  Excision means the lesion is cut out and the skin stitched up  • Most appropriate treatment for nodular, infiltrative and morphoeic BCCs   • Should include 3 to 5 mm margin of normal skin around the tumour   • Very large lesions may require flap or skin graft to repair the defect   • Pathologist will report deep and lateral margins   • Further surgery is recommended for lesions that are incompletely excised    Mohs micrographically controlled excision  Mohs micrographically controlled surgery involves examining carefully marked excised tissue under the microscope, layer by layer, to ensure complete excision  • Very high cure rates achieved by trained Mohs surgeons   • Used in high-risk areas of the face around eyes, lips and nose   • Suitable for ill-defined, morphoeic, infiltrative and recurrent subtypes   • Large defects are repaired by flap or skin graft    Superficial skin surgery  Superficial skin surgery comprises shave, curettage, and electrocautery  It is a rapid technique using local anaesthesia and does not require sutures  • Suitable for small, well-defined nodular or superficial BCCs   • Lesions are usually located on trunk or limbs   • Wound is left open to heal by secondary intention   • Moist wound dressings lead to healing within a few weeks   • Eventual scar quality variable    Cryotherapy  Cryotherapy is the treatment of a superficial skin lesion by freezing it, usually with liquid nitrogen  • Suitable for small superficial BCCs on covered areas of trunk and limbs   • Best avoided for BCCs on head and neck, and distal to knees   • Double freeze-thaw technique   • Results in a blister that crusts over and heals within several weeks     • Leaves permanent white janis    Photodynamic therapy  Photodynamic therapy (PDT) refers to a technique in which [facility] is treated with a photosensitising chemical, and exposed to light several hours later  • Topical photosensitisers include aminolevulinic acid lotion and methyl aminolevulinate cream   • Suitable for low-risk small, superficial BCCs   • Best avoided if tumour in site at high risk of recurrence   • Results in inflammatory reaction, maximal 3-4 days after procedure   • Treatment repeated 7 days after initial treatment   • Excellent cosmetic results    Imiquimod cream  Imiquimod is an immune response modifier  • Best used for superficial BCCs less than 2 cm diameter   • Applied three to five times each week, for 6-16 weeks   • Results in a variable inflammatory reaction, maximal at three weeks   • Minimal scarring is usual    Fluorouracil cream  5-Fluorouracil cream is a topical cytotoxic agent  • Used to treat small superficial basal cell carcinomas   • Requires prolonged course, eg twice daily for 6-12 weeks   • Causes inflammatory reaction   • Has high recurrence rates    Radiotherapy  Radiotherapy or X-ray treatment can be used to treat primary BCCs or as adjunctive treatment if margins are incomplete  • Mainly used if surgery is not suitable   • Best avoided in young patients and in genetic conditions predisposing to skin cancer   • Best cosmetic results achieved using multiple fractions   • Typically, patient attends once-weekly for several weeks   • Causes inflammatory reaction followed by scar   • Risk of radiodermatitis, late recurrence, and new tumours    What is the treatment for advanced or metastatic basal cell carcinoma? Locally advanced primary, recurrent or metastatic BCC requires multidisciplinary consultation  Often a combination of treatments is used  • Surgery   • Radiotherapy   • Targeted therapy  Targeted therapy refers to the hedgehog signalling pathway inhibitors, vismodegib and sonidegib  These drugs have some important risks and side effects  How can basal cell carcinoma be prevented?   The most important way to prevent BCC is to avoid sunburn  This is especially important in childhood and early life  Fair skinned individuals and those with a personal or family history of BCC should protect their skin from sun exposure daily, year-round and lifelong  • Stay indoors or under the shade in the middle of the day   • Wear covering clothing   • Apply high protection factor SPF50+ broad-spectrum sunscreens generously to exposed skin if outdoors   • Avoid indoor tanning (sun beds, solaria)  • Oral nicotinamide (vitamin B3) in a dose of 500 mg twice daily may reduce the number and severity of BCCs  What is the outlook for basal cell carcinoma? Most BCCs are cured by treatment  Cure is most likely if treatment is undertaken when the lesion is small  About 50% of people with BCC develop a second one within 3 years of the first  They are also at increased risk of other skin cancers, especially melanoma  Regular self-skin examinations and long-term annual skin checks by an experienced health professional are recommended      Scribe Attestation    I,:  Macie Odell MA am acting as a scribe while in the presence of the attending physician :       I,:  Ramírez Ritter MD personally performed the services described in this documentation    as scribed in my presence :

## 2023-02-23 ENCOUNTER — TELEPHONE (OUTPATIENT)
Dept: ADMINISTRATIVE | Facility: HOSPITAL | Age: 63
End: 2023-02-23

## 2023-02-27 NOTE — TELEPHONE ENCOUNTER
Rising psa  MRI requested and approved  3D rendering denied, pending result of pirads/target lesion 4 or higher    Agree with plan, will remain denied at this time    Proceed with MRI alone  If pirads lesion 4 or 5 can meet criteria for retroactive 3D rendering    Lab Results   Component Value Date    PSA 5 8 (H) 02/03/2023    PSA 3 8 04/13/2021

## 2023-03-01 ENCOUNTER — APPOINTMENT (OUTPATIENT)
Dept: LAB | Facility: CLINIC | Age: 63
End: 2023-03-01

## 2023-03-01 DIAGNOSIS — R97.20 ELEVATED PSA: ICD-10-CM

## 2023-03-01 LAB
ANION GAP SERPL CALCULATED.3IONS-SCNC: 7 MMOL/L (ref 4–13)
BUN SERPL-MCNC: 17 MG/DL (ref 5–25)
CALCIUM SERPL-MCNC: 9.5 MG/DL (ref 8.3–10.1)
CHLORIDE SERPL-SCNC: 107 MMOL/L (ref 96–108)
CO2 SERPL-SCNC: 25 MMOL/L (ref 21–32)
CREAT SERPL-MCNC: 1.08 MG/DL (ref 0.6–1.3)
GFR SERPL CREATININE-BSD FRML MDRD: 74 ML/MIN/1.73SQ M
GLUCOSE P FAST SERPL-MCNC: 99 MG/DL (ref 65–99)
POTASSIUM SERPL-SCNC: 4.2 MMOL/L (ref 3.5–5.3)
SODIUM SERPL-SCNC: 139 MMOL/L (ref 135–147)

## 2023-03-06 ENCOUNTER — HOSPITAL ENCOUNTER (OUTPATIENT)
Dept: RADIOLOGY | Age: 63
Discharge: HOME | End: 2023-03-06

## 2023-03-06 DIAGNOSIS — R97.20 ELEVATED PSA: ICD-10-CM

## 2023-03-06 RX ADMIN — GADOBUTROL 10 ML: 604.72 INJECTION INTRAVENOUS at 08:45

## 2023-03-08 ENCOUNTER — TELEPHONE (OUTPATIENT)
Dept: OTHER | Facility: OTHER | Age: 63
End: 2023-03-08

## 2023-03-09 NOTE — TELEPHONE ENCOUNTER
Im assuming you mean his MRI? He has appointment next week for further discussion   He will need MRI targeted biopsy

## 2023-03-17 ENCOUNTER — TELEPHONE (OUTPATIENT)
Dept: UROLOGY | Facility: MEDICAL CENTER | Age: 63
End: 2023-03-17

## 2023-03-17 NOTE — TELEPHONE ENCOUNTER
Patient called stating he is returning 's call  Message left on my chart for him to call the office  He did read the message and he stated he is ok with date the date and either facility is ok with him       Patient can be reached at [phone]

## 2023-04-26 ENCOUNTER — APPOINTMENT (OUTPATIENT)
Dept: LAB | Facility: CLINIC | Age: 63
End: 2023-04-26

## 2023-04-26 ENCOUNTER — CLINICAL SUPPORT (OUTPATIENT)
Dept: URGENT CARE | Facility: CLINIC | Age: 63
End: 2023-04-26
Payer: COMMERCIAL

## 2023-04-26 DIAGNOSIS — R97.20 ELEVATED PROSTATE SPECIFIC ANTIGEN (PSA): ICD-10-CM

## 2023-04-26 LAB
ALBUMIN SERPL BCP-MCNC: 3.8 G/DL (ref 3.5–5)
ALP SERPL-CCNC: 72 U/L (ref 46–116)
ALT SERPL W P-5'-P-CCNC: 43 U/L (ref 12–78)
ANION GAP SERPL CALCULATED.3IONS-SCNC: 4 MMOL/L (ref 4–13)
AST SERPL W P-5'-P-CCNC: 31 U/L (ref 5–45)
BASOPHILS # BLD AUTO: 0.04 THOUSANDS/ΜL (ref 0–0.1)
BASOPHILS NFR BLD AUTO: 1 % (ref 0–1)
BILIRUB SERPL-MCNC: 0.5 MG/DL (ref 0.2–1)
BUN SERPL-MCNC: 19 MG/DL (ref 5–25)
CALCIUM SERPL-MCNC: 9.4 MG/DL (ref 8.3–10.1)
CHLORIDE SERPL-SCNC: 105 MMOL/L (ref 96–108)
CO2 SERPL-SCNC: 27 MMOL/L (ref 21–32)
CREAT SERPL-MCNC: 1.09 MG/DL (ref 0.6–1.3)
EOSINOPHIL # BLD AUTO: 0.17 THOUSAND/ΜL (ref 0–0.61)
EOSINOPHIL NFR BLD AUTO: 3 % (ref 0–6)
ERYTHROCYTE [DISTWIDTH] IN BLOOD BY AUTOMATED COUNT: 12.3 % (ref 11.6–15.1)
GFR SERPL CREATININE-BSD FRML MDRD: 72 ML/MIN/1.73SQ M
GLUCOSE P FAST SERPL-MCNC: 101 MG/DL (ref 65–99)
HCT VFR BLD AUTO: 46.7 % (ref 36.5–49.3)
HGB BLD-MCNC: 15.8 G/DL (ref 12–17)
IMM GRANULOCYTES # BLD AUTO: 0.02 THOUSAND/UL (ref 0–0.2)
IMM GRANULOCYTES NFR BLD AUTO: 0 % (ref 0–2)
LYMPHOCYTES # BLD AUTO: 2.4 THOUSANDS/ΜL (ref 0.6–4.47)
LYMPHOCYTES NFR BLD AUTO: 35 % (ref 14–44)
MCH RBC QN AUTO: 31.1 PG (ref 26.8–34.3)
MCHC RBC AUTO-ENTMCNC: 33.8 G/DL (ref 31.4–37.4)
MCV RBC AUTO: 92 FL (ref 82–98)
MONOCYTES # BLD AUTO: 0.61 THOUSAND/ΜL (ref 0.17–1.22)
MONOCYTES NFR BLD AUTO: 9 % (ref 4–12)
NEUTROPHILS # BLD AUTO: 3.65 THOUSANDS/ΜL (ref 1.85–7.62)
NEUTS SEG NFR BLD AUTO: 52 % (ref 43–75)
NRBC BLD AUTO-RTO: 0 /100 WBCS
PLATELET # BLD AUTO: 275 THOUSANDS/UL (ref 149–390)
PMV BLD AUTO: 9.9 FL (ref 8.9–12.7)
POTASSIUM SERPL-SCNC: 4.1 MMOL/L (ref 3.5–5.3)
PROT SERPL-MCNC: 7.3 G/DL (ref 6.4–8.4)
RBC # BLD AUTO: 5.08 MILLION/UL (ref 3.88–5.62)
SODIUM SERPL-SCNC: 136 MMOL/L (ref 135–147)
WBC # BLD AUTO: 6.89 THOUSAND/UL (ref 4.31–10.16)

## 2023-04-26 PROCEDURE — 93005 ELECTROCARDIOGRAM TRACING: CPT

## 2023-04-27 LAB
ATRIAL RATE: 71 BPM
BACTERIA UR CULT: NORMAL
P AXIS: 41 DEGREES
PR INTERVAL: 176 MS
QRS AXIS: 80 DEGREES
QRSD INTERVAL: 76 MS
QT INTERVAL: 400 MS
QTC INTERVAL: 434 MS
T WAVE AXIS: 42 DEGREES
VENTRICULAR RATE: 71 BPM

## 2023-05-08 NOTE — PRE-PROCEDURE INSTRUCTIONS
Pre-Surgery Instructions:   Medication Instructions   • allopurinol (ZYLOPRIM) 100 mg tablet Take day of surgery  • Multiple Vitamin (MULTIVITAMIN) capsule Stop taking 7 days prior to surgery  • tamsulosin (FLOMAX) 0 4 mg Take night before surgery   Medication instructions for day surgery reviewed  Please use only a sip of water to take your instructed medications  Avoid all over the counter vitamins, supplements and NSAIDS for one week prior to surgery per anesthesia guidelines  Tylenol is ok to take as needed  Pt states he stopped taking the multi vit a week before surgery  You will receive a call one business day prior to surgery with an arrival time and hospital directions  If your surgery is scheduled on a Monday, the hospital will be calling you on the Friday prior to your surgery  If you have not heard from anyone by 8pm, please call the hospital supervisor through the hospital  at [phone]  Lanny Mckinley [phone])  Do not eat or drink anything after midnight the night before your surgery, including candy, mints, lifesavers, or chewing gum  Do not drink alcohol 24hrs before your surgery  Try not to smoke at least 24hrs before your surgery  Follow the pre surgery showering instructions as listed in the [facility] Surgical Experience Booklet” or otherwise provided by your surgeon's office  Do not shave the surgical area 24 hours before surgery  Do not apply any lotions, creams, including makeup, cologne, deodorant, or perfumes after showering on the day of your surgery  No contact lenses, eye make-up, or artificial eyelashes  Remove nail polish, including gel polish, and any artificial, gel, or acrylic nails if possible  Remove all jewelry including rings and body piercing jewelry  Wear causal clothing that is easy to take on and off  Consider your type of surgery  Keep any valuables, jewelry, piercings at home   Please bring any specially ordered equipment (sling, braces) if indicated  Arrange for a responsible person to drive you to and from the hospital on the day of your surgery  Visitor Guidelines discussed  Call the surgeon's office with any new illnesses, exposures, or additional questions prior to surgery  Please reference your [facility] Surgical Experience Booklet” for additional information to prepare for your upcoming surgery

## 2023-05-09 ENCOUNTER — ANESTHESIA (OUTPATIENT)
Dept: PERIOP | Facility: AMBULARY SURGERY CENTER | Age: 63
End: 2023-05-09

## 2023-05-09 ENCOUNTER — ANESTHESIA EVENT (OUTPATIENT)
Dept: PERIOP | Facility: AMBULARY SURGERY CENTER | Age: 63
End: 2023-05-09

## 2023-05-09 ENCOUNTER — HOSPITAL ENCOUNTER (OUTPATIENT)
Facility: AMBULARY SURGERY CENTER | Age: 63
Setting detail: OUTPATIENT SURGERY
Discharge: HOME | End: 2023-05-09
Attending: UROLOGY | Admitting: UROLOGY

## 2023-05-09 VITALS
HEART RATE: 60 BPM | TEMPERATURE: 97.2 F | RESPIRATION RATE: 16 BRPM | HEIGHT: 74 IN | SYSTOLIC BLOOD PRESSURE: 125 MMHG | OXYGEN SATURATION: 97 % | DIASTOLIC BLOOD PRESSURE: 68 MMHG | WEIGHT: 244 LBS | BODY MASS INDEX: 31.32 KG/M2

## 2023-05-09 DIAGNOSIS — R97.20 ELEVATED PROSTATE SPECIFIC ANTIGEN (PSA): ICD-10-CM

## 2023-05-09 RX ORDER — ONDANSETRON 2 MG/ML
INJECTION INTRAMUSCULAR; INTRAVENOUS AS NEEDED
Status: DISCONTINUED | OUTPATIENT
Start: 2023-05-09 | End: 2023-05-09

## 2023-05-09 RX ORDER — CEFAZOLIN SODIUM 2 G/50ML
SOLUTION INTRAVENOUS AS NEEDED
Status: DISCONTINUED | OUTPATIENT
Start: 2023-05-09 | End: 2023-05-09

## 2023-05-09 RX ORDER — LIDOCAINE HYDROCHLORIDE 20 MG/ML
INJECTION, SOLUTION EPIDURAL; INFILTRATION; INTRACAUDAL; PERINEURAL AS NEEDED
Status: DISCONTINUED | OUTPATIENT
Start: 2023-05-09 | End: 2023-05-09

## 2023-05-09 RX ORDER — SODIUM CHLORIDE, SODIUM LACTATE, POTASSIUM CHLORIDE, CALCIUM CHLORIDE 600; 310; 30; 20 MG/100ML; MG/100ML; MG/100ML; MG/100ML
INJECTION, SOLUTION INTRAVENOUS CONTINUOUS PRN
Status: DISCONTINUED | OUTPATIENT
Start: 2023-05-09 | End: 2023-05-09

## 2023-05-09 RX ORDER — PROPOFOL 10 MG/ML
INJECTION, EMULSION INTRAVENOUS CONTINUOUS PRN
Status: DISCONTINUED | OUTPATIENT
Start: 2023-05-09 | End: 2023-05-09

## 2023-05-09 RX ORDER — MAGNESIUM HYDROXIDE 1200 MG/15ML
LIQUID ORAL AS NEEDED
Status: DISCONTINUED | OUTPATIENT
Start: 2023-05-09 | End: 2023-05-09 | Stop reason: HOSPADM

## 2023-05-09 RX ORDER — CEFAZOLIN SODIUM 2 G/50ML
2000 SOLUTION INTRAVENOUS ONCE
Status: DISCONTINUED | OUTPATIENT
Start: 2023-05-09 | End: 2023-05-09 | Stop reason: HOSPADM

## 2023-05-09 RX ORDER — PROPOFOL 10 MG/ML
INJECTION, EMULSION INTRAVENOUS AS NEEDED
Status: DISCONTINUED | OUTPATIENT
Start: 2023-05-09 | End: 2023-05-09

## 2023-05-09 RX ORDER — LIDOCAINE HYDROCHLORIDE 20 MG/ML
INJECTION, SOLUTION INFILTRATION; PERINEURAL AS NEEDED
Status: DISCONTINUED | OUTPATIENT
Start: 2023-05-09 | End: 2023-05-09 | Stop reason: HOSPADM

## 2023-05-09 RX ORDER — HYDROMORPHONE HCL/PF 1 MG/ML
0.2 SYRINGE (ML) INJECTION
Status: DISCONTINUED | OUTPATIENT
Start: 2023-05-09 | End: 2023-05-09 | Stop reason: HOSPADM

## 2023-05-09 RX ORDER — FENTANYL CITRATE 50 UG/ML
INJECTION, SOLUTION INTRAMUSCULAR; INTRAVENOUS AS NEEDED
Status: DISCONTINUED | OUTPATIENT
Start: 2023-05-09 | End: 2023-05-09

## 2023-05-09 RX ORDER — DEXAMETHASONE SODIUM PHOSPHATE 10 MG/ML
INJECTION, SOLUTION INTRAMUSCULAR; INTRAVENOUS AS NEEDED
Status: DISCONTINUED | OUTPATIENT
Start: 2023-05-09 | End: 2023-05-09

## 2023-05-09 RX ORDER — SODIUM CHLORIDE, SODIUM LACTATE, POTASSIUM CHLORIDE, CALCIUM CHLORIDE 600; 310; 30; 20 MG/100ML; MG/100ML; MG/100ML; MG/100ML
20 INJECTION, SOLUTION INTRAVENOUS CONTINUOUS
Status: DISCONTINUED | OUTPATIENT
Start: 2023-05-09 | End: 2023-05-09 | Stop reason: HOSPADM

## 2023-05-09 RX ADMIN — PROPOFOL 30 MG: 10 INJECTION, EMULSION INTRAVENOUS at 08:32

## 2023-05-09 RX ADMIN — CEFAZOLIN SODIUM 2000 MG: 2 SOLUTION INTRAVENOUS at 08:26

## 2023-05-09 RX ADMIN — FENTANYL CITRATE 100 MCG: 50 INJECTION INTRAMUSCULAR; INTRAVENOUS at 08:30

## 2023-05-09 RX ADMIN — PROPOFOL 50 MG: 10 INJECTION, EMULSION INTRAVENOUS at 08:30

## 2023-05-09 RX ADMIN — SODIUM CHLORIDE, SODIUM LACTATE, POTASSIUM CHLORIDE, AND CALCIUM CHLORIDE: .6; .31; .03; .02 INJECTION, SOLUTION INTRAVENOUS at 08:26

## 2023-05-09 RX ADMIN — DEXAMETHASONE SODIUM PHOSPHATE 10 MG: 10 INJECTION, SOLUTION INTRAMUSCULAR; INTRAVENOUS at 08:41

## 2023-05-09 RX ADMIN — ONDANSETRON 4 MG: 2 INJECTION INTRAMUSCULAR; INTRAVENOUS at 08:41

## 2023-05-09 RX ADMIN — LIDOCAINE HYDROCHLORIDE 50 MG: 20 INJECTION, SOLUTION EPIDURAL; INFILTRATION; INTRACAUDAL at 08:30

## 2023-05-09 RX ADMIN — PROPOFOL 50 MG: 10 INJECTION, EMULSION INTRAVENOUS at 08:31

## 2023-05-09 RX ADMIN — PROPOFOL 120 MCG/KG/MIN: 10 INJECTION, EMULSION INTRAVENOUS at 08:30

## 2023-05-09 NOTE — H&P
UROLOGY HISTORY AND PHYSICAL     Patient Identifiers: Carilyn Feller Apgar (MRN [id number])      Date of Service: 2023        ASSESSMENT:     66 y o  old male with  history of elevated PSA with MRI showing PI-RADS 4 lesions  PLAN:     MRI guided transperineal fusion biopsy of the prostate      History of Present Illness:     Carilyn Feller Apgar is a 66 y o  old with a history of BPH with lower tract symptoms and elevated PSA  Patient's PSA cydney to 5 8 in 2023 from 5 in  from 3 8 in   JEREMIAH unremarkable  Patient recommended to have an MRI which showed evidence of 2 PI-RADS 4 lesions both located on the right side in the peripheral zone  Prostate volume 42 cc  He elected for MRI guided fusion biopsy of the prostate  Urine culture negative  Past Medical, Past Surgical History:     Past Medical History:   Diagnosis Date   • Atrial fibrillation ([facility] [address] )    • Basal cell carcinoma 11/10/2022    left cheek   • CPAP (continuous positive airway pressure) dependence    • H/O cardiac radiofrequency ablation    • Sleep apnea    :    Past Surgical History:   Procedure Laterality Date   • APPENDECTOMY     • COLONOSCOPY     • MOHS SURGERY Left 2023    BCC left cheek-Dr Rebolledo   • TONSILLECTOMY     :    Medications, Allergies:     Current Facility-Administered Medications:   •  ceFAZolin (ANCEF) IVPB (premix in dextrose) 2,000 mg 50 mL, 2,000 mg, Intravenous, Once, Cash Kraft MD    Allergies:  No Known Allergies:    Social and Family History:   Social History:   Social History     Tobacco Use   • Smoking status: Never   • Smokeless tobacco: Never   Vaping Use   • Vaping Use: Never used   Substance Use Topics   • Alcohol use: Yes     Comment: rare   • Drug use: Never         Social History     Tobacco Use   Smoking Status Never   Smokeless Tobacco Never       Family History:  Family History   Problem Relation Age of Onset   • Cancer Mother    • Cancer Father    • Diabetes Maternal Grandmother    • Cancer "Maternal Grandmother    • Diabetes Paternal Grandmother    • Cancer Paternal Grandmother    • Cancer Paternal Grandfather    :     Review of Systems:     General: Fever, chills, or night sweats: negative  Cardiac: Negative for chest pain  Pulmonary: Negative for shortness of breath  Gastrointestinal: Abdominal pain negative  Nausea, vomiting, or diarrhea negative  Genitourinary: See HPI above  Patient does nothave hematuria  All other systems queried were negative  Physical Exam:   General: Patient is pleasant and in NAD  Awake and alert  /73   Pulse 76   Temp 98 5 °F (36 9 °C) (Temporal)   Resp 16   Ht 6' 2\" (1 88 m)   Wt 111 kg (244 lb)   SpO2 96%   BMI 31 33 kg/m²   HEENT:  Normocephalic atraumatic  Cardiac:  Regular rate and rhythm, Peripheral edema: negative  Pulmonary: Non-labored breathing, CTAB  Abdomen: Soft, non-tender, non-distended  No surgical scars  No masses, tenderness, hernias noted  Genitourinary: negative CVA tenderness, neg suprapubic tenderness  Extremities: normal movement in all 4       Labs:     Lab Results   Component Value Date    HGB 15 8 04/26/2023    HCT 46 7 04/26/2023    WBC 6 89 04/26/2023     04/26/2023   ]    Lab Results   Component Value Date    K 4 1 04/26/2023     04/26/2023    CO2 27 04/26/2023    BUN 19 04/26/2023    CREATININE 1 09 04/26/2023    CALCIUM 9 4 04/26/2023   ]    Imaging:   I personally reviewed the images and report of the following studies, and reviewed them with the patient:    MRI showing PI-RADS 4 lesions at the right anterior peripheral zone and another PI-RADS 4 lesion at the posterior peripheral zone prostate volume 42 cc    Thank you for allowing me to participate in this patients’ care  Please do not hesitate to call with any additional questions    Janeth Patel MD    "

## 2023-05-09 NOTE — ANESTHESIA POSTPROCEDURE EVALUATION
Post-Op Assessment Note    CV Status:  Stable  Pain Score: 0    Pain management: adequate     Mental Status:  Alert and awake   Hydration Status:  Euvolemic   PONV Controlled:  Controlled   Airway Patency:  Patent      Post Op Vitals Reviewed: Yes      Staff: CRNA, Anesthesiologist         No notable events documented      BP   125/66   Temp   98 2   Pulse  76   Resp   15   SpO2   100

## 2023-05-09 NOTE — OP NOTE
OPERATIVE REPORT  PATIENT NAME: Delfina Jew Apgar    :  1960  MRN: [id number]  Pt Location: [facility] OR [facility]OM 05    SURGERY DATE: 2023    Surgeon(s) and Role:     * Riki Perry MD - Primary    Preop Diagnosis:  Elevated prostate specific antigen (PSA) [R97 20]    Post-Op Diagnosis Codes:     * Elevated prostate specific antigen (PSA) [R97 20]    Procedure(s):  TRANSPERINEAL MRI FUSION  BIOPSY PROSTATE    Specimen(s):  ID Type Source Tests Collected by Time Destination   1 : RIGHT ANTERIOR MEDIAL Tissue Prostate TISSUE EXAM Riki Perry MD 2023 0818    2 : RIGHT ANTERIOR LATERAL Tissue Prostate TISSUE EXAM Riki Perry MD 2023 0818    3 : RIGHT POSTERIOR LATERAL Tissue Prostate TISSUE EXAM Riki Perry MD 2023 0818    4 : LEFT ANTERIOR MEDIAL Tissue Prostate TISSUE EXAM Riki Perry MD 2023 0818    5 : LEFT ANTERIOR LATERAL Tissue Prostate TISSUE EXAM Riki Perry MD 2023 0818    6 : LEFT POSTERIOR LATERAL Tissue Prostate TISSUE EXAM Riki Perry MD 2023 0818    7 : LEFT POSTERIOR MEDIAL Tissue Prostate TISSUE EXAM Riki Perry MD 2023 0818    8 : LEFT BASE Tissue Prostate TISSUE EXAM Riki Perry MD 2023 0818    9 : RIGHT POSTERIOR MEDIAL Tissue Prostate TISSUE EXAM Riki Perry MD 2023 0818    10 : RIGHT BASE Tissue Prostate TISSUE EXAM Riki Perry MD 2023 0818    11 : area of interest - right anterior peripheral zone Tissue Prostate TISSUE EXAM Riik Perry MD 2023 1215    12 : area of interest - right posterior peripheral zone Tissue Prostate TISSUE EXAM Riki Perry MD 2023 2306        Estimated Blood Loss:   Minimal    Drains:  * No LDAs found *    Anesthesia Type:   IV Sedation with Anesthesia    Operative Indications:  Elevated prostate specific antigen (PSA) [R97 20]      Operative Findings:  7 biopsies from right anterior ZAYNAB  8 biopsies from right posterior ZAYNAB  56 biopsies taken in total    Complications:   None    Procedure and Technique:    Procedure was transperineal biopsy utilizing the Precision Point perineal access device      He was pretreated with Ancef antibiotic  He was met in the prep and hold area and the procedure along with its risks and benefits were discussed and reviewed  Patient signed an informed consent  Transferred to OR  He was placed in dorsal lithotomy with care to pad all pressure points  His perineum and genitalia were shave/prepped with a single blade razor  The scrotum was elevated a secured aware from the perineum above the rectum with sterile ioban  His perineum and genitalia were prepped with a ChloraPrep solution  With the assistance of the [address] [place] [address] technician, a survey of the prostate anatomy was performed  The technician then linked the previously obtained MRI images to the real-time ultrasound  The PIRADs lesions seen on MRI were identified on the ultrasound  Ultrasound was performed to map patient's prostate real time to [facility] with elastic deformation  In the midportion of the left and right prostate, a janis was denoted in the perineal skin  An associated skin wheal was created with 5 cc of 1% lidocaine plain in both of these mid lobe locations on either side  The PrecisionPoint device was then introduced into the left perineal subcutaneous tissue  We visualized the tip of the needle  Needle was introduced through the subcutaneous fat and into the pelvic floor muscle where a perineal pudendal block was performed with additional 5 cc of 1% lidocaine  This was repeated on the patient's right side  Utilizing the [place] [employer] access device, the perineum was access via finder needle  The [place] machine was utilized to place the needle into the concerning area at the right anterior peripheral zone MRI with 7 biopsies taken    The software provided confirmation that biopsies were within the region of interest     The posterior right peripheral zone region of interest was then targeted and 8 biopsies were taken  We then performed our standard prostate biopsy template to include right anterior medial and latera,l right posterior medial and lateral, right base, left anterior medial and lateral left posterior medial and lateral and left base  A total 56 biopsies were taken in total     The transplant rectal ultrasound probe was removed  The [place] was removed from the skin  Direct pressure was held for 2 minutes for hemostasis  At the completion of the procedure, the patient was taken out of dorsal lithotomy, extubated and transferred to PACU in good condition  Overall the patient tolerated the procedure and there were no complications  Plan-the patient will return for biopsy pathology review in 2 weeks  A qualified resident physician was not available      Patient Disposition:  PACU         SIGNATURE: Henrietta Bello MD  DATE: May 9, 2023  TIME: 8:55 AM

## 2023-05-09 NOTE — DISCHARGE INSTR - AVS FIRST PAGE
Mr Teresa Pina D Apgar,      Your perineal biopsy procedure went well  We were able to obtain the samples in the areas we try to target and have sent these to Pathology for their review  The results should be back in 5-7 business days  There should be an appointment set up to discuss the results in person  Some blood in the urine is normal for approximately 1 week after surgery  It can last in the ejaculate for up to 1 month  Most patients do not need prescription medications after this procedure (including no antibiotics or narcotic pain medications)  Please try taking Tylenol and Motrin over-the-counter to help with discomfort  If you are having significant pain issues please contact me  Please call us immediately if you are having fevers or chills or feel like you have a infection  Some patients can have difficulty with voiding after a biopsy because of swelling of the prostate which can block the urethra  This usually improves with time but if you are having difficulty voiding please let us now as we may need to temporarily insert a catheter  You have any questions or concerns please do not hesitate to call us, [phone]  Rey Brantley MD      Prostate Biopsy   WHAT YOU NEED TO KNOW:   A prostate biopsy is a procedure to remove samples of tissue from your prostate gland  The prostate is a male sex gland that makes fluid found in semen  It is located just below the bladder  After the samples are removed, they are sent to a lab and tested for cancer  DISCHARGE INSTRUCTIONS:   Seek care immediately if:   You have heavy bleeding from your urethra  You urinate very little or not at all  You have pain from your procedure that gets worse, even after you take pain medicine  Call your urologist or doctor if:   You have a fever or chills  You feel pain or burning when you urinate  Your urine is cloudy or smells bad      You have questions or concerns about your condition or care  Medicines: You may need any of the following:  Antibiotics  help prevent or treat a bacterial infection  Acetaminophen  decreases pain and fever  It is available without a doctor's order  Ask how much to take and how often to take it  Follow directions  Read the labels of all other medicines you are using to see if they also contain acetaminophen, or ask your doctor or pharmacist  Acetaminophen can cause liver damage if not taken correctly  Do not use more than 4 grams (4,000 milligrams) total of acetaminophen in one day  Prescription pain medicine  may be given  Ask your healthcare provider how to take this medicine safely  Some prescription pain medicines contain acetaminophen  Do not take other medicines that contain acetaminophen without talking to your healthcare provider  Too much acetaminophen may cause liver damage  Prescription pain medicine may cause constipation  Ask your healthcare provider how to prevent or treat constipation  Take your medicine as directed  Contact your healthcare provider if you think your medicine is not helping or if you have side effects  Tell him or her if you are allergic to any medicine  Keep a list of the medicines, vitamins, and herbs you take  Include the amounts, and when and why you take them  Bring the list or the pill bottles to follow-up visits  Carry your medicine list with you in case of an emergency  Follow up with your urologist or doctor as directed: You may need to return for more tests or procedures  Write down your questions so you remember to ask them during your visits  © Copyright [employer] 2021 Information is for End User's use only and may not be sold, redistributed or otherwise used for commercial purposes  All illustrations and images included in CareNotes® are the copyrighted property of [employer]  or [employer]  The above information is an  only   It is not intended as medical advice for individual conditions or treatments  Talk to your doctor, nurse or pharmacist before following any medical regimen to see if it is safe and effective for you

## 2023-05-09 NOTE — ANESTHESIA PREPROCEDURE EVALUATION
Procedure:  TRANSPERINEAL MRI FUSION  BIOPSY PROSTATE (Perineum)    Relevant Problems   CARDIO   (+) Atrial fibrillation (HCC)      PULMONARY   (+) Sleep apnea      Other   (+) CPAP (continuous positive airway pressure) dependence   (+) H/O cardiac radiofrequency ablation        Physical Exam    Airway    Mallampati score: II  TM Distance: >3 FB  Neck ROM: full     Dental       Cardiovascular      Pulmonary      Other Findings        Anesthesia Plan  ASA Score- 3     Anesthesia Type- IV sedation with anesthesia with ASA Monitors  Additional Monitors:   Airway Plan:           Plan Factors-Exercise tolerance (METS): >4 METS  Chart reviewed  EKG reviewed  Imaging results reviewed  Patient summary reviewed  Patient is not a current smoker  Induction- intravenous  Postoperative Plan-     Informed Consent- Anesthetic plan and risks discussed with patient  I personally reviewed this patient with the CRNA  Discussed and agreed on the Anesthesia Plan with the CRNA  Lakesha Richmond Awake

## 2023-05-12 ENCOUNTER — TELEPHONE (OUTPATIENT)
Dept: UROLOGY | Facility: AMBULATORY SURGERY CENTER | Age: 63
End: 2023-05-12

## 2023-05-12 NOTE — TELEPHONE ENCOUNTER
I called pt and discussed his results showing GG2 and GG1 CaP including in his regions of interest on MRI  We briefly discussed implications of this  He has an appointment see Dr Leonora Pugh in a few weeks to discuss further  He says he is otherwise doing well

## 2023-05-22 PROBLEM — C61 PROSTATE CANCER (HCC): Status: ACTIVE | Noted: 2023-05-22

## 2023-05-22 PROBLEM — Z71.2 ENCOUNTER TO DISCUSS TEST RESULTS: Status: ACTIVE | Noted: 2023-05-22

## 2023-05-22 PROBLEM — R97.20 ELEVATED PSA: Status: ACTIVE | Noted: 2023-05-22

## 2023-05-22 NOTE — PROGRESS NOTES
Problem List Items Addressed This Visit        Respiratory    Sleep apnea - Primary       Cardiovascular and Mediastinum    Atrial fibrillation (HCC)       Genitourinary    Prostate cancer ([facility] [address] )    Relevant Orders    Case request operating room: PROSTATECTOMY RADICAL AND PELVIC LYMPH NODE DISSECTION LAPAROSCOPIC W/ ROBOTICS (Completed)       Other    CPAP (continuous positive airway pressure) dependence    H/O cardiac radiofrequency ablation    Elevated PSA    Encounter to discuss test results             Discussion:      Today we discussed his pathology from his prostate biopsy including definition of prostate cancer risk groups, a discussion of the Kinjal score and the meaning of his biopsy results in terms of his future management and overall health and treatment  I had a discussion with the patient regarding the natural history and treatment options for prostate cancer and the potential need for multimodal treatments  Active surveillance, surgical excision in the form of open or robotic surgery, and radiation therapies plus or minus androgen deprivation therapy were discussed at length with the patient  With regard to multimodal therapy discussion this could include surgical excision with postoperative radiation therapy (RT) +/-androgen deprivation therapy (ADT) if adverse pathology or RT with long-term ADT  I discussed robot assisted laparoscopic radical prostatectomy with the inclusion or exclusion of bilateral pelvic lymph node dissection (depending on disease parameters and intraoperative findings) in depth with the patient  I discussed that surgery has potential advantages compared with radiation including the more accurate prognostic, pathologic information afforded by the surgical pathology, the more immediate indication of effective treatment as indicated by an undetectable PSA, as well as the relatively easier use of adjuvant or salvage radiotherapy postoperatively if the need arises  I discussed that in comparison with open surgery that robotic prostatectomy has the benefits of improved convalescence and decreased blood loss and appears to have equivalent cancer control rates and quality-of-life side effects such similar rates of urinary incontinence and erectile dysfunction  I discussed that most men stay in the hospital for 1-2 days and go home with a catheter for 7-10 days  I discussed the side effects of surgery including stress incontinence, and I counseled him that most men leak urine immediately after surgery  I expect a stress incontinence rate at six months in the 10%-15% range, improving to 5%-10% in one year  I did communicate that there additional treatments for urinary incontinence that may be persistent in the post-prostatectomy setting  In terms of erectile dysfunction, we discussed the normal physiology of the erectile response as well as discussing the anatomy of the cavernous nerves  I drew a diagram for him outlining the pelvic anatomy with regard to the bladder, prostate, and parasympathetic nerve plexus running laterally to the prostate  I will do everything that I safely can to attempt a nerve-sparing procedure if deemed to be appropriate intraoperatively  I did discuss with him that my priority is 1st to rid him of his cancer, 2nd to keep him dry, and 3rd to keep him with sexual function  He does understand that his sexual function postoperatively is a function of his current sexual function preoperatively and is closely tied to his overall general health  He understands that there are other therapies for erectile dysfunction the post-prostatectomy setting including oral therapies, injections, and penile prosthesis placement  Additionally, I did stress to him that his post-operative sexual function will not be as good as his preoperative sexual function due to the nature of prostate surgery   We discussed that men are surgically sterile after radical "prostatectomy    I discussed other surgical complications including, but not limited to, a bladder neck contracture rate in the 5%-10% range any time after surgery, a rectal injury rate of 1% or less, an open conversion rate in the 1% range, a lymphocele causing symptoms and/or requiring intervention in the 5% range, and a transfusion rate in the 1% to 2% range  Additionally, he may experience some degree of penile remodeling and/or loss of penile length after surgery  He has participated in shared/informed decision-making model with regard to this major urologic surgery with risk  All questions answered and addressed    His history of atrial fibrillation s/p ablation and his sleep apnea do raise his risk for perioperative anesthesia complications slightly  His functional status is currently excellent and he is fit and well muscled without frailty  As outlined above, the risks, benefits, potential complications, and alternative therapies were explained to the patient  Informed consent for the proposed procedure was obtained  Portions of the above record have been created with voice recognition software  Occasional wrong word or \"sound alike\" substitution may have occurred due to the inherent limitations of voice recognition software  Read the chart carefully and recognize, using context, where substitution may have occurred  Assessment and plan:       Please see problem oriented charting for the assessment plan of today's urological complaints      Isidro Cardona MD      Chief Complaint     As listed above      History of Present Illness     Braden Lana Apgar is a 66 y o  man presenting in consultation for results from his recent MRI targeted biopsy  This shows intermediate risk prostate cancer      His current ECOG is 0    He has participated in the shared/informed decision making model with regard to his treatment options and with consideration of major urologic surgery with risk and he wishes to " proceed to RALP with PLND    The following portions of the patient's history were reviewed and updated as appropriate: allergies, current medications, past family history, past medical history, past social history, past surgical history and problem list     Detailed Urologic History     - please refer to HPI    Review of Systems     Review of Systems   Constitutional: Negative  HENT: Negative  Eyes: Negative  Respiratory: Negative  Cardiovascular: Negative  Gastrointestinal: Negative  Endocrine: Negative  Genitourinary: Negative  Musculoskeletal: Negative  Skin: Negative  Allergic/Immunologic: Negative  Neurological: Negative  Hematological: Negative  Psychiatric/Behavioral: Negative  AUA SYMPTOM SCORE    Flowsheet Row Most Recent Value   AUA SYMPTOM SCORE    How often have you had a sensation of not emptying your bladder completely after you finished urinating? 3 (P)     How often have you had to urinate again less than two hours after you finished urinating? 2 (P)     How often have you found you stopped and started again several times when you urinate? 2 (P)     How often have you found it difficult to postpone urination? 1 (P)     How often have you had a weak urinary stream? 1 (P)     How often have you had to push or strain to begin urination? 1 (P)     How many times did you most typically get up to urinate from the time you went to bed at night until the time you got up in the morning? 3 (P)     Quality of Life: If you were to spend the rest of your life with your urinary condition just the way it is now, how would you feel about that? 3 (P)     AUA SYMPTOM SCORE 13 (P)             Allergies     No Known Allergies    Physical Exam     Physical Exam  Vitals reviewed  Constitutional:       General: He is not in acute distress  Appearance: Normal appearance  He is not ill-appearing, toxic-appearing or diaphoretic     HENT:      Head: Normocephalic and "atraumatic  Eyes:      General: No scleral icterus  Right eye: No discharge  Left eye: No discharge  Cardiovascular:      Pulses: Normal pulses  Pulmonary:      Effort: Pulmonary effort is normal    Abdominal:      General: There is no distension  Musculoskeletal:         General: No swelling  Skin:     Coloration: Skin is not jaundiced  Neurological:      General: No focal deficit present  Mental Status: He is alert and oriented to person, place, and time  Cranial Nerves: No cranial nerve deficit  Psychiatric:         Mood and Affect: Mood normal          Behavior: Behavior normal          Thought Content:  Thought content normal          Judgment: Judgment normal              Vital Signs  Vitals:    05/23/23 1123   BP: 122/76   Pulse: 79   SpO2: 99%   Weight: 111 kg (245 lb)   Height: 6' 2\" (1 88 m)         Current Medications       Current Outpatient Medications:   •  allopurinol (ZYLOPRIM) 100 mg tablet, Take 100 mg by mouth, Disp: , Rfl:   •  Multiple Vitamin (MULTIVITAMIN) capsule, Take by mouth, Disp: , Rfl:   •  tamsulosin (FLOMAX) 0 4 mg, TAKE 1 CAPSULE BY MOUTH EVERY DAY WITH DINNER, Disp: 90 capsule, Rfl: 2      Active Problems     Patient Active Problem List   Diagnosis   • Irritable bowel syndrome with diarrhea   • Sleep apnea   • CPAP (continuous positive airway pressure) dependence   • H/O cardiac radiofrequency ablation   • Atrial fibrillation (HCC)   • Elevated PSA   • Prostate cancer (HCC)   • Encounter to discuss test results         Past Medical History     Past Medical History:   Diagnosis Date   • Atrial fibrillation ([facility] [address] )    • Basal cell carcinoma 11/10/2022    left cheek   • BPH (benign prostatic hypertrophy) 09/2020   • CPAP (continuous positive airway pressure) dependence    • H/O cardiac radiofrequency ablation    • Sleep apnea          Surgical History     Past Surgical History:   Procedure Laterality Date   • APPENDECTOMY     • COLONOSCOPY     • MOHS " SURGERY Left 04/12/2023    BCC left cheek-Dr Rebolledo   • NE BX PROSTATE STRTCTC SATURATION SAMPLING IMG GID N/A 5/9/2023    Procedure: TRANSPERINEAL MRI FUSION  BIOPSY PROSTATE;  Surgeon: Isidro Barrios MD;  Location: [facility] MAIN OR;  Service: Urology   • TONSILLECTOMY           Family History     Family History   Problem Relation Age of Onset   • Cancer Mother    • Cancer Father    • Diabetes Maternal Grandmother    • Cancer Maternal Grandmother    • Diabetes Paternal Grandmother    • Cancer Paternal Grandmother    • Cancer Paternal Grandfather          Social History     Social History     Social History     Tobacco Use   Smoking Status Never   • Passive exposure: Past   Smokeless Tobacco Never         Pertinent Lab Values     Lab Results   Component Value Date    CREATININE 1 09 04/26/2023       Lab Results   Component Value Date    PSA 5 8 (H) 02/03/2023    PSA 3 8 04/13/2021       Final Diagnosis   A  Prostate, RIGHT ANTERIOR MEDIAL; biopsy:      - Prostatic adenocarcinoma, acinar; Kinjal grade 3 + 4 = score of 7 (20% pattern 4), grade group 2, in 2 of 2 cores involving approximately 44% of total needle core tissue and measuring 12 mm in total length      - Periprostatic fat invasion: Not identified      - Perineural invasion: Not identified      - Additional Pathologic Findings: None      - An immunohistochemical prostatic multiplex triple stain supports the diagnosis (Block A1)     B  Prostate, RIGHT ANTERIOR LATERAL; biopsy:      - Prostatic adenocarcinoma, acinar; Kinjal grade 3 + 4 = score of 7 (10% pattern 4), grade group 2, in 1 of 2 cores involving approximately 20% of total needle core tissue and measuring 5 mm in total length      - Periprostatic fat invasion: Not identified      - Perineural invasion: Not identified      - Additional Pathologic Findings: None      - An immunohistochemical prostatic multiplex triple stain supports the diagnosis (Block B2)      C   Prostate, RIGHT POSTERIOR LATERAL; biopsy:      - Benign prostatic glands and stroma      D  Prostate, LEFT ANTERIOR MEDIAL; biopsy:      - Prostatic adenocarcinoma, acinar; Kinjal grade 3 + 3 = score of 6, grade group 1, in 1 of 5 fragmented cores involving approximately 2 5% of total needle core tissue and measuring 1 2 mm in total length      - Periprostatic fat invasion: Not identified      - Perineural invasion: Not identified      - Additional Pathologic Findings: None     E  Prostate, LEFT ANTERIOR LATERAL; biopsy:      - Benign prostatic glands and stroma       F  Prostate, LEFT POSTERIOR LATERAL; biopsy:      - Benign prostatic glands and stroma        G  Prostate, LEFT POSTERIOR MEDIAL; biopsy:      - Benign prostatic glands and stroma        H  Prostate, LEFT BASE; biopsy:      - Benign prostatic glands and stroma        I  Prostate, RIGHT POSTERIOR MEDIAL; biopsy:      - Benign prostatic glands and stroma         J  Prostate, RIGHT BASE; biopsy:      - Benign prostatic glands and stroma         K  Prostate, area of interest - right anterior peripheral zone; biopsy:      - Prostatic adenocarcinoma, acinar; Kinjal grade 3 + 4 = score of 7 (10% pattern 4), grade group 2, in 5 of 5 cores involving approximately 39% of total needle core tissue and measuring 26 3 mm in total length      - Periprostatic fat invasion: Not identified      - Perineural invasion: Not identified      - Additional Pathologic Findings: None      - An immunohistochemical prostatic multiplex triple stain supports the diagnosis (Blocks K1, K3)      L   Prostate, area of interest - right posterior peripheral zone; biopsy:      - Prostatic adenocarcinoma, acinar; Kinjal grade 3 + 3 = score of 6, grade group 1, in 1 of 5 fragmented cores involving approximately 1% of total needle core tissue and measuring 0 5 mm in total length      - Periprostatic fat invasion: Not identified      - Perineural invasion: Not identified      - Additional Pathologic Findings: None       - An immunohistochemical prostatic multiplex triple stain supports the diagnosis (Blocks L3, L5 )    Electronically signed by Jillian Baird MD on 5/11/2023 at  8:52 AM             Pertinent Imaging       The patient's images were reviewed by me personally and also in real time with them in the examination room using our PACS imaging system  The imaging findings are significant for a 52 gram gland, category 4, no extraprostatic tumor, SV invasion, LAD, or osseous lesions

## 2023-05-23 ENCOUNTER — OFFICE VISIT (OUTPATIENT)
Dept: UROLOGY | Facility: CLINIC | Age: 63
End: 2023-05-23

## 2023-05-23 VITALS
OXYGEN SATURATION: 99 % | HEIGHT: 74 IN | DIASTOLIC BLOOD PRESSURE: 76 MMHG | WEIGHT: 245 LBS | SYSTOLIC BLOOD PRESSURE: 122 MMHG | HEART RATE: 79 BPM | BODY MASS INDEX: 31.44 KG/M2

## 2023-05-23 DIAGNOSIS — Z71.2 ENCOUNTER TO DISCUSS TEST RESULTS: ICD-10-CM

## 2023-05-23 DIAGNOSIS — G47.30 SLEEP APNEA, UNSPECIFIED TYPE: Primary | ICD-10-CM

## 2023-05-23 DIAGNOSIS — Z98.890 H/O CARDIAC RADIOFREQUENCY ABLATION: ICD-10-CM

## 2023-05-23 DIAGNOSIS — R97.20 ELEVATED PSA: ICD-10-CM

## 2023-05-23 DIAGNOSIS — Z99.89 CPAP (CONTINUOUS POSITIVE AIRWAY PRESSURE) DEPENDENCE: ICD-10-CM

## 2023-05-23 DIAGNOSIS — I48.91 ATRIAL FIBRILLATION, UNSPECIFIED TYPE (HCC): ICD-10-CM

## 2023-05-23 DIAGNOSIS — C61 PROSTATE CANCER (HCC): ICD-10-CM

## 2023-05-24 ENCOUNTER — PREP FOR PROCEDURE (OUTPATIENT)
Dept: UROLOGY | Facility: CLINIC | Age: 63
End: 2023-05-24

## 2023-05-24 ENCOUNTER — TELEPHONE (OUTPATIENT)
Dept: UROLOGY | Facility: CLINIC | Age: 63
End: 2023-05-24

## 2023-05-24 NOTE — TELEPHONE ENCOUNTER
Spoke w/ pt/scheduled pt for Dr Jill Salomon 6/26/2023 @ [facility]/scheduled pt for Medical clearance w/ Dr Katherine Lanza [facility]) for 6/13/2023/pre op bloodwork/urine culture ordered for 6/5/2023surgical bag/ENSURE given to pt @ checkout/pt will need POST OP APPT SCHEDULED w/ Dr London Scott

## 2023-05-25 NOTE — TELEPHONE ENCOUNTER
Patient returning call  Patient confirming appt on 7/17 at 11:30 with Dr Efraín Barnett in [place]  Patient also has a question about the ensures  He stated it says to drink one an hour before his procedure but also says to not eat or drink after midnight   He is wanting to confirm if he is still supposed to drink one    Patient requesting a call back at [phone]

## 2023-05-25 NOTE — TELEPHONE ENCOUNTER
I went to call and confirm with patient but noticed the surgery and post op are scheduled the same day  Was this in error?

## 2023-06-05 ENCOUNTER — APPOINTMENT (OUTPATIENT)
Dept: LAB | Facility: CLINIC | Age: 63
End: 2023-06-05
Payer: COMMERCIAL

## 2023-06-05 ENCOUNTER — LAB REQUISITION (OUTPATIENT)
Dept: LAB | Facility: HOSPITAL | Age: 63
End: 2023-06-05
Payer: COMMERCIAL

## 2023-06-05 DIAGNOSIS — Z01.812 PRE-OPERATIVE LABORATORY EXAMINATION: ICD-10-CM

## 2023-06-05 DIAGNOSIS — Z79.01 LONG TERM (CURRENT) USE OF ANTICOAGULANTS: ICD-10-CM

## 2023-06-05 DIAGNOSIS — Z01.812 ENCOUNTER FOR PREPROCEDURAL LABORATORY EXAMINATION: ICD-10-CM

## 2023-06-05 DIAGNOSIS — C61 PROSTATE CANCER (HCC): ICD-10-CM

## 2023-06-05 DIAGNOSIS — R39.89 SUSPECTED UTI: ICD-10-CM

## 2023-06-05 LAB
ABO GROUP BLD: NORMAL
ANION GAP SERPL CALCULATED.3IONS-SCNC: 4 MMOL/L (ref 4–13)
APTT PPP: 30 SECONDS (ref 23–37)
BASOPHILS # BLD AUTO: 0.03 THOUSANDS/ÂΜL (ref 0–0.1)
BASOPHILS NFR BLD AUTO: 0 % (ref 0–1)
BLD GP AB SCN SERPL QL: NEGATIVE
BUN SERPL-MCNC: 17 MG/DL (ref 5–25)
CALCIUM SERPL-MCNC: 9.4 MG/DL (ref 8.3–10.1)
CHLORIDE SERPL-SCNC: 109 MMOL/L (ref 96–108)
CO2 SERPL-SCNC: 26 MMOL/L (ref 21–32)
CREAT SERPL-MCNC: 1.1 MG/DL (ref 0.6–1.3)
EOSINOPHIL # BLD AUTO: 0.12 THOUSAND/ÂΜL (ref 0–0.61)
EOSINOPHIL NFR BLD AUTO: 2 % (ref 0–6)
ERYTHROCYTE [DISTWIDTH] IN BLOOD BY AUTOMATED COUNT: 13.1 % (ref 11.6–15.1)
GFR SERPL CREATININE-BSD FRML MDRD: 72 ML/MIN/1.73SQ M
GLUCOSE P FAST SERPL-MCNC: 128 MG/DL (ref 65–99)
HCT VFR BLD AUTO: 48 % (ref 36.5–49.3)
HGB BLD-MCNC: 15.6 G/DL (ref 12–17)
IMM GRANULOCYTES # BLD AUTO: 0.02 THOUSAND/UL (ref 0–0.2)
IMM GRANULOCYTES NFR BLD AUTO: 0 % (ref 0–2)
INR PPP: 1.06 (ref 0.84–1.19)
LYMPHOCYTES # BLD AUTO: 2.51 THOUSANDS/ÂΜL (ref 0.6–4.47)
LYMPHOCYTES NFR BLD AUTO: 37 % (ref 14–44)
MCH RBC QN AUTO: 31 PG (ref 26.8–34.3)
MCHC RBC AUTO-ENTMCNC: 32.5 G/DL (ref 31.4–37.4)
MCV RBC AUTO: 95 FL (ref 82–98)
MONOCYTES # BLD AUTO: 0.56 THOUSAND/ÂΜL (ref 0.17–1.22)
MONOCYTES NFR BLD AUTO: 8 % (ref 4–12)
NEUTROPHILS # BLD AUTO: 3.48 THOUSANDS/ÂΜL (ref 1.85–7.62)
NEUTS SEG NFR BLD AUTO: 53 % (ref 43–75)
NRBC BLD AUTO-RTO: 0 /100 WBCS
PLATELET # BLD AUTO: 206 THOUSANDS/UL (ref 149–390)
PMV BLD AUTO: 10.8 FL (ref 8.9–12.7)
POTASSIUM SERPL-SCNC: 4.5 MMOL/L (ref 3.5–5.3)
PROTHROMBIN TIME: 14.1 SECONDS (ref 11.6–14.5)
RBC # BLD AUTO: 5.04 MILLION/UL (ref 3.88–5.62)
RH BLD: POSITIVE
SODIUM SERPL-SCNC: 139 MMOL/L (ref 135–147)
SPECIMEN EXPIRATION DATE: NORMAL
WBC # BLD AUTO: 6.72 THOUSAND/UL (ref 4.31–10.16)

## 2023-06-05 PROCEDURE — 36415 COLL VENOUS BLD VENIPUNCTURE: CPT

## 2023-06-05 PROCEDURE — 85025 COMPLETE CBC W/AUTO DIFF WBC: CPT

## 2023-06-05 PROCEDURE — 80048 BASIC METABOLIC PNL TOTAL CA: CPT

## 2023-06-05 PROCEDURE — 87086 URINE CULTURE/COLONY COUNT: CPT

## 2023-06-05 PROCEDURE — 86900 BLOOD TYPING SEROLOGIC ABO: CPT | Performed by: UROLOGY

## 2023-06-05 PROCEDURE — 86850 RBC ANTIBODY SCREEN: CPT | Performed by: UROLOGY

## 2023-06-05 PROCEDURE — 85610 PROTHROMBIN TIME: CPT

## 2023-06-05 PROCEDURE — 86901 BLOOD TYPING SEROLOGIC RH(D): CPT | Performed by: UROLOGY

## 2023-06-05 PROCEDURE — 85730 THROMBOPLASTIN TIME PARTIAL: CPT

## 2023-06-05 NOTE — PRE-PROCEDURE INSTRUCTIONS
Pre-Surgery Instructions:   Medication Instructions   • allopurinol (ZYLOPRIM) 100 mg tablet Take this medication day of surgery if normally taken in the morning. • Multiple Vitamin (MULTIVITAMIN) capsule Hold this medication for 7 days before surgery     • tamsulosin (FLOMAX) 0.4 mg Hold day of surgery      Medication instructions for day surgery reviewed. Please use only a sip of water to take your instructed medications. Avoid all over the counter vitamins, supplements and NSAIDS for one week prior to surgery per anesthesia guidelines. Tylenol is ok to take as needed. You will receive a call one business day prior to surgery with an arrival time and hospital directions. If your surgery is scheduled on a Monday, the hospital will be calling you on the Friday prior to your surgery. If you have not heard from anyone by 8pm, please call the hospital supervisor through the hospital  at [phone]. Kristy Weller [phone]). Do not eat or drink anything after midnight the night before your surgery, including candy, mints, lifesavers, or chewing gum. Do not drink alcohol 24hrs before your surgery. Try not to smoke at least 24hrs before your surgery. Follow the pre surgery showering instructions as listed in the [facility] Surgical Experience Booklet” or otherwise provided by your surgeon's office. Do not shave the surgical area 24 hours before surgery. Do not apply any lotions, creams, including makeup, cologne, deodorant, or perfumes after showering on the day of your surgery. No contact lenses, eye make-up, or artificial eyelashes. Remove nail polish, including gel polish, and any artificial, gel, or acrylic nails if possible. Remove all jewelry including rings and body piercing jewelry. Wear causal clothing that is easy to take on and off. Consider your type of surgery. Keep any valuables, jewelry, piercings at home.  Please bring any specially ordered equipment (sling, braces) if indicated. Arrange for a responsible person to drive you to and from the hospital on the day of your surgery. Visitor Guidelines discussed. Call the surgeon's office with any new illnesses, exposures, or additional questions prior to surgery. Please reference your [facility] Surgical Experience Booklet” for additional information to prepare for your upcoming surgery. Pt instructed to bring CPAP with him DOS.

## 2023-06-06 ENCOUNTER — ANESTHESIA EVENT (OUTPATIENT)
Dept: PERIOP | Facility: HOSPITAL | Age: 63
End: 2023-06-06
Payer: COMMERCIAL

## 2023-06-06 LAB — BACTERIA UR CULT: ABNORMAL

## 2023-06-21 PROCEDURE — NC001 PR NO CHARGE: Performed by: UROLOGY

## 2023-06-21 NOTE — H&P
H&P Exam - Urology   Benedict Branch Apgar 66 y.o. male MRN: [id number]  Unit/Bed#: OR [place] Encounter: [id number]    Assessment/Plan     Assessment:  66year old man with intermediate risk prostate cancer here for robot assisted laparoscopic radical prostatectomy    Informed consent previously signed    Plan:  Proceed to robot assisted laparoscopic radical prostatectomy with possible pelvic lymph node dissection and all indicated procedures    History of Present Illness   HPI:  Benedict Branch Apgar is a 66 y.o. male who presents with intermediate risk prostate cancer, he has elected for surgical management via RALP +/- BPLND    ECOG is 0    The following portions of the patient's history were reviewed and updated as appropriate: allergies, current medications, past family history, past medical history, past social history, past surgical history and problem list.  .    Review of Systems   Constitutional: Negative. HENT: Negative. Eyes: Negative. Respiratory: Negative. Cardiovascular: Negative. Gastrointestinal: Negative. Endocrine: Negative. Genitourinary: Negative. Musculoskeletal: Negative. Skin: Negative. Allergic/Immunologic: Negative. Neurological: Negative. Hematological: Negative. Psychiatric/Behavioral: Negative.         Historical Information   Past Medical History:   Diagnosis Date   • Asthma     in younger years   • Atrial fibrillation ([address])    • Basal cell carcinoma 2023    left cheek   • BPH (benign prostatic hypertrophy) 2020   • CPAP (continuous positive airway pressure) dependence    • H/O cardiac radiofrequency ablation    • Hard to intubate     given card but lost it   • History of transfusion    • Sleep apnea      Past Surgical History:   Procedure Laterality Date   • APPENDECTOMY     • CARDIAC ELECTROPHYSIOLOGY STUDY AND ABLATION     • CARDIAC SURGERY  1968    Patent Ductus   • COLONOSCOPY     • MOHS SURGERY Left 2023    BCC left cheek-Dr Rebolledo   • [place] BX PROSTATE STRTCTC SATURATION SAMPLING G GID N/A 05/09/2023    Procedure: TRANSPERINEAL MRI FUSION  BIOPSY PROSTATE;  Surgeon: Franck Gaines MD;  Location: [facility] MAIN OR;  Service: Urology   • TONSILLECTOMY       Social History   Social History     Substance and Sexual Activity   Alcohol Use Yes   • Alcohol/week: 1.0 standard drink of alcohol   • Types: 1 Cans of beer per week    Comment: rare     Social History     Substance and Sexual Activity   Drug Use Never     Social History     Tobacco Use   Smoking Status Never   • Passive exposure: Past   Smokeless Tobacco Never     E-Cigarette/Vaping   • E-Cigarette Use Never User      E-Cigarette/Vaping Substances   • Nicotine No    • THC No    • CBD No    • Flavoring No    • Other No    • Unknown No      Family History:   Family History   Problem Relation Age of Onset   • Cancer Mother    • Cancer Father    • Diabetes Maternal Grandmother    • Cancer Maternal Grandmother    • Diabetes Paternal Grandmother    • Cancer Paternal Grandmother    • Cancer Paternal Grandfather        Meds/Allergies   all medications and allergies reviewed  No Known Allergies    Objective   Vitals: Blood pressure 126/74, pulse 74, temperature 98 °F (36.7 °C), temperature source Temporal, resp. rate 15, height 6' 2" (1.88 m), weight 111 kg (245 lb), SpO2 97 %. No intake/output data recorded. Invasive Devices     Peripheral Intravenous Line  Duration           Peripheral IV 06/26/23 Dorsal (posterior); Right Hand <1 day                Physical Exam  Vitals reviewed. Constitutional:       General: He is not in acute distress. Appearance: Normal appearance. He is not ill-appearing, toxic-appearing or diaphoretic. HENT:      Head: Normocephalic and atraumatic. Eyes:      General: No scleral icterus. Right eye: No discharge. Left eye: No discharge. Pulmonary:      Effort: Pulmonary effort is normal.   Abdominal:      General: There is no distension.    Musculoskeletal: General: No swelling. Skin:     Coloration: Skin is not jaundiced. Neurological:      Mental Status: He is alert. Cranial Nerves: No cranial nerve deficit. Psychiatric:         Mood and Affect: Mood normal.         Behavior: Behavior normal.         Thought Content: Thought content normal.         Judgment: Judgment normal.         Lab Results: I have personally reviewed pertinent reports. Imaging: I have personally reviewed pertinent reports. EKG, Pathology, and Other Studies: I have personally reviewed pertinent reports. VTE Prophylaxis: Sequential compression device Berny Queen)     Code Status: No Order  Advance Directive and Living Will:      Power of :    POLST:      Counseling / Coordination of Care  Total floor / unit time spent today 15 minutes. Greater than 50% of total time was spent with the patient and / or family counseling and / or coordination of care. A description of the counseling / coordination of care: review of today's case.

## 2023-06-21 NOTE — DISCHARGE INSTR - AVS FIRST PAGE
Sury Dye,    Today you had a robot assisted laparoscopic radical prostatectomy. Your tissue surrounding the prostate was inflamed and this made the anastomosis more difficult and I would like you to keep your catheter for a full two weeks. We will remove your everett catheter in 10-14 days, give or take. Some blood in the urine and urgency and frequency with the occasional bladder spasm may occur after surgery like this.     I look forward to seeing you back in the office,    Dr. Roslyn Espinosa

## 2023-06-26 ENCOUNTER — HOSPITAL ENCOUNTER (OUTPATIENT)
Facility: HOSPITAL | Age: 63
Setting detail: OUTPATIENT SURGERY
Discharge: HOME | End: 2023-06-28
Attending: UROLOGY | Admitting: UROLOGY
Payer: COMMERCIAL

## 2023-06-26 ENCOUNTER — TELEPHONE (OUTPATIENT)
Dept: UROLOGY | Facility: CLINIC | Age: 63
End: 2023-06-26

## 2023-06-26 ENCOUNTER — ANESTHESIA (OUTPATIENT)
Dept: PERIOP | Facility: HOSPITAL | Age: 63
End: 2023-06-26
Payer: COMMERCIAL

## 2023-06-26 DIAGNOSIS — C61 PROSTATE CANCER (HCC): Primary | ICD-10-CM

## 2023-06-26 LAB
ABO GROUP BLD: NORMAL
ANION GAP SERPL CALCULATED.3IONS-SCNC: 7 MMOL/L
BUN SERPL-MCNC: 15 MG/DL (ref 5–25)
CALCIUM SERPL-MCNC: 8.1 MG/DL (ref 8.4–10.2)
CHLORIDE SERPL-SCNC: 107 MMOL/L (ref 96–108)
CO2 SERPL-SCNC: 22 MMOL/L (ref 21–32)
CREAT SERPL-MCNC: 1.12 MG/DL (ref 0.6–1.3)
ERYTHROCYTE [DISTWIDTH] IN BLOOD BY AUTOMATED COUNT: 12.8 % (ref 11.6–15.1)
GFR SERPL CREATININE-BSD FRML MDRD: 70 ML/MIN/1.73SQ M
GLUCOSE P FAST SERPL-MCNC: 135 MG/DL (ref 65–99)
GLUCOSE SERPL-MCNC: 135 MG/DL (ref 65–140)
HCT VFR BLD AUTO: 45.1 % (ref 36.5–49.3)
HGB BLD-MCNC: 15.2 G/DL (ref 12–17)
MCH RBC QN AUTO: 31 PG (ref 26.8–34.3)
MCHC RBC AUTO-ENTMCNC: 33.7 G/DL (ref 31.4–37.4)
MCV RBC AUTO: 92 FL (ref 82–98)
PLATELET # BLD AUTO: 207 THOUSANDS/UL (ref 149–390)
PMV BLD AUTO: 9.6 FL (ref 8.9–12.7)
POTASSIUM SERPL-SCNC: 4.5 MMOL/L (ref 3.5–5.3)
RBC # BLD AUTO: 4.91 MILLION/UL (ref 3.88–5.62)
RH BLD: POSITIVE
SODIUM SERPL-SCNC: 136 MMOL/L (ref 135–147)
WBC # BLD AUTO: 16.8 THOUSAND/UL (ref 4.31–10.16)

## 2023-06-26 PROCEDURE — 55866 LAPS SURG PRST8ECT RPBIC RAD: CPT | Performed by: UROLOGY

## 2023-06-26 PROCEDURE — 88305 TISSUE EXAM BY PATHOLOGIST: CPT | Performed by: PATHOLOGY

## 2023-06-26 PROCEDURE — 85027 COMPLETE CBC AUTOMATED: CPT | Performed by: UROLOGY

## 2023-06-26 PROCEDURE — 88344 IMHCHEM/IMCYTCHM EA MLT ANTB: CPT | Performed by: PATHOLOGY

## 2023-06-26 PROCEDURE — 88341 IMHCHEM/IMCYTCHM EA ADD ANTB: CPT | Performed by: PATHOLOGY

## 2023-06-26 PROCEDURE — 80048 BASIC METABOLIC PNL TOTAL CA: CPT | Performed by: UROLOGY

## 2023-06-26 PROCEDURE — 86920 COMPATIBILITY TEST SPIN: CPT

## 2023-06-26 PROCEDURE — 88309 TISSUE EXAM BY PATHOLOGIST: CPT | Performed by: PATHOLOGY

## 2023-06-26 PROCEDURE — 88342 IMHCHEM/IMCYTCHM 1ST ANTB: CPT | Performed by: PATHOLOGY

## 2023-06-26 RX ORDER — KETAMINE HCL IN NACL, ISO-OSM 100MG/10ML
SYRINGE (ML) INJECTION AS NEEDED
Status: DISCONTINUED | OUTPATIENT
Start: 2023-06-26 | End: 2023-06-26

## 2023-06-26 RX ORDER — ACETAMINOPHEN 325 MG/1
650 TABLET ORAL EVERY 6 HOURS SCHEDULED
Status: DISCONTINUED | OUTPATIENT
Start: 2023-06-26 | End: 2023-06-28 | Stop reason: HOSPADM

## 2023-06-26 RX ORDER — TRANEXAMIC ACID 10 MG/ML
INJECTION, SOLUTION INTRAVENOUS AS NEEDED
Status: DISCONTINUED | OUTPATIENT
Start: 2023-06-26 | End: 2023-06-26

## 2023-06-26 RX ORDER — SODIUM CHLORIDE 9 MG/ML
INJECTION, SOLUTION INTRAVENOUS CONTINUOUS PRN
Status: DISCONTINUED | OUTPATIENT
Start: 2023-06-26 | End: 2023-06-26

## 2023-06-26 RX ORDER — OXYCODONE HYDROCHLORIDE 10 MG/1
10 TABLET ORAL EVERY 4 HOURS PRN
Status: DISCONTINUED | OUTPATIENT
Start: 2023-06-26 | End: 2023-06-28 | Stop reason: HOSPADM

## 2023-06-26 RX ORDER — ALLOPURINOL 100 MG/1
100 TABLET ORAL DAILY
Status: DISCONTINUED | OUTPATIENT
Start: 2023-06-27 | End: 2023-06-28 | Stop reason: HOSPADM

## 2023-06-26 RX ORDER — ACETAMINOPHEN 500 MG
500 TABLET ORAL EVERY 6 HOURS
Qty: 20 TABLET | Refills: 0 | Status: SHIPPED | OUTPATIENT
Start: 2023-06-26 | End: 2023-07-01

## 2023-06-26 RX ORDER — HEPARIN SODIUM 5000 [USP'U]/ML
5000 INJECTION, SOLUTION INTRAVENOUS; SUBCUTANEOUS EVERY 8 HOURS SCHEDULED
Status: DISCONTINUED | OUTPATIENT
Start: 2023-06-26 | End: 2023-06-28 | Stop reason: HOSPADM

## 2023-06-26 RX ORDER — SUCCINYLCHOLINE/SOD CL,ISO/PF 100 MG/5ML
SYRINGE (ML) INTRAVENOUS AS NEEDED
Status: DISCONTINUED | OUTPATIENT
Start: 2023-06-26 | End: 2023-06-26

## 2023-06-26 RX ORDER — OXYBUTYNIN CHLORIDE 5 MG/1
5 TABLET ORAL 2 TIMES DAILY PRN
Status: DISCONTINUED | OUTPATIENT
Start: 2023-06-26 | End: 2023-06-28 | Stop reason: HOSPADM

## 2023-06-26 RX ORDER — ONDANSETRON 2 MG/ML
INJECTION INTRAMUSCULAR; INTRAVENOUS AS NEEDED
Status: DISCONTINUED | OUTPATIENT
Start: 2023-06-26 | End: 2023-06-26

## 2023-06-26 RX ORDER — SENNOSIDES 8.6 MG
1 TABLET ORAL DAILY
Status: DISCONTINUED | OUTPATIENT
Start: 2023-06-27 | End: 2023-06-28 | Stop reason: HOSPADM

## 2023-06-26 RX ORDER — CEFAZOLIN SODIUM 2 G/50ML
2000 SOLUTION INTRAVENOUS EVERY 8 HOURS
Status: COMPLETED | OUTPATIENT
Start: 2023-06-26 | End: 2023-06-27

## 2023-06-26 RX ORDER — ROCURONIUM BROMIDE 10 MG/ML
INJECTION, SOLUTION INTRAVENOUS AS NEEDED
Status: DISCONTINUED | OUTPATIENT
Start: 2023-06-26 | End: 2023-06-26

## 2023-06-26 RX ORDER — FUROSEMIDE 10 MG/ML
INJECTION INTRAMUSCULAR; INTRAVENOUS AS NEEDED
Status: DISCONTINUED | OUTPATIENT
Start: 2023-06-26 | End: 2023-06-26

## 2023-06-26 RX ORDER — BUPIVACAINE HYDROCHLORIDE 2.5 MG/ML
INJECTION, SOLUTION EPIDURAL; INFILTRATION; INTRACAUDAL AS NEEDED
Status: DISCONTINUED | OUTPATIENT
Start: 2023-06-26 | End: 2023-06-26 | Stop reason: HOSPADM

## 2023-06-26 RX ORDER — CEFAZOLIN SODIUM 2 G/50ML
2000 SOLUTION INTRAVENOUS ONCE
Status: COMPLETED | OUTPATIENT
Start: 2023-06-26 | End: 2023-06-26

## 2023-06-26 RX ORDER — OXYCODONE HYDROCHLORIDE 5 MG/1
5 TABLET ORAL EVERY 4 HOURS PRN
Status: DISCONTINUED | OUTPATIENT
Start: 2023-06-26 | End: 2023-06-28 | Stop reason: HOSPADM

## 2023-06-26 RX ORDER — FENTANYL CITRATE/PF 50 MCG/ML
50 SYRINGE (ML) INJECTION
Status: DISCONTINUED | OUTPATIENT
Start: 2023-06-26 | End: 2023-06-26 | Stop reason: HOSPADM

## 2023-06-26 RX ORDER — SODIUM CHLORIDE, SODIUM LACTATE, POTASSIUM CHLORIDE, CALCIUM CHLORIDE 600; 310; 30; 20 MG/100ML; MG/100ML; MG/100ML; MG/100ML
INJECTION, SOLUTION INTRAVENOUS CONTINUOUS PRN
Status: DISCONTINUED | OUTPATIENT
Start: 2023-06-26 | End: 2023-06-26

## 2023-06-26 RX ORDER — DEXAMETHASONE SODIUM PHOSPHATE 10 MG/ML
INJECTION, SOLUTION INTRAMUSCULAR; INTRAVENOUS AS NEEDED
Status: DISCONTINUED | OUTPATIENT
Start: 2023-06-26 | End: 2023-06-26

## 2023-06-26 RX ORDER — HYDROMORPHONE HCL/PF 1 MG/ML
SYRINGE (ML) INJECTION AS NEEDED
Status: DISCONTINUED | OUTPATIENT
Start: 2023-06-26 | End: 2023-06-26

## 2023-06-26 RX ORDER — SODIUM CHLORIDE, SODIUM LACTATE, POTASSIUM CHLORIDE, CALCIUM CHLORIDE 600; 310; 30; 20 MG/100ML; MG/100ML; MG/100ML; MG/100ML
125 INJECTION, SOLUTION INTRAVENOUS CONTINUOUS
Status: DISCONTINUED | OUTPATIENT
Start: 2023-06-26 | End: 2023-06-27

## 2023-06-26 RX ORDER — GLYCOPYRROLATE 0.2 MG/ML
INJECTION INTRAMUSCULAR; INTRAVENOUS AS NEEDED
Status: DISCONTINUED | OUTPATIENT
Start: 2023-06-26 | End: 2023-06-26

## 2023-06-26 RX ORDER — LIDOCAINE HYDROCHLORIDE 10 MG/ML
INJECTION, SOLUTION EPIDURAL; INFILTRATION; INTRACAUDAL; PERINEURAL AS NEEDED
Status: DISCONTINUED | OUTPATIENT
Start: 2023-06-26 | End: 2023-06-26

## 2023-06-26 RX ORDER — ONDANSETRON 2 MG/ML
4 INJECTION INTRAMUSCULAR; INTRAVENOUS EVERY 6 HOURS PRN
Status: DISCONTINUED | OUTPATIENT
Start: 2023-06-26 | End: 2023-06-28 | Stop reason: HOSPADM

## 2023-06-26 RX ORDER — SODIUM CHLORIDE, SODIUM LACTATE, POTASSIUM CHLORIDE, CALCIUM CHLORIDE 600; 310; 30; 20 MG/100ML; MG/100ML; MG/100ML; MG/100ML
50 INJECTION, SOLUTION INTRAVENOUS CONTINUOUS
Status: CANCELLED | OUTPATIENT
Start: 2023-06-26

## 2023-06-26 RX ORDER — DOCUSATE SODIUM 100 MG/1
100 CAPSULE, LIQUID FILLED ORAL 2 TIMES DAILY
Status: DISCONTINUED | OUTPATIENT
Start: 2023-06-26 | End: 2023-06-28 | Stop reason: HOSPADM

## 2023-06-26 RX ORDER — MAGNESIUM HYDROXIDE 1200 MG/15ML
LIQUID ORAL AS NEEDED
Status: DISCONTINUED | OUTPATIENT
Start: 2023-06-26 | End: 2023-06-26 | Stop reason: HOSPADM

## 2023-06-26 RX ORDER — DICLOFENAC SODIUM 25 MG/1
25 TABLET, DELAYED RELEASE ORAL 2 TIMES DAILY
Qty: 10 TABLET | Refills: 0 | Status: SHIPPED | OUTPATIENT
Start: 2023-06-26 | End: 2023-07-01

## 2023-06-26 RX ORDER — ONDANSETRON 2 MG/ML
4 INJECTION INTRAMUSCULAR; INTRAVENOUS ONCE AS NEEDED
Status: DISCONTINUED | OUTPATIENT
Start: 2023-06-26 | End: 2023-06-26 | Stop reason: HOSPADM

## 2023-06-26 RX ORDER — PROPOFOL 10 MG/ML
INJECTION, EMULSION INTRAVENOUS AS NEEDED
Status: DISCONTINUED | OUTPATIENT
Start: 2023-06-26 | End: 2023-06-26

## 2023-06-26 RX ORDER — PROPOFOL 10 MG/ML
INJECTION, EMULSION INTRAVENOUS CONTINUOUS PRN
Status: DISCONTINUED | OUTPATIENT
Start: 2023-06-26 | End: 2023-06-26

## 2023-06-26 RX ORDER — HYDROMORPHONE HCL/PF 1 MG/ML
0.5 SYRINGE (ML) INJECTION EVERY 2 HOUR PRN
Status: DISCONTINUED | OUTPATIENT
Start: 2023-06-26 | End: 2023-06-28 | Stop reason: HOSPADM

## 2023-06-26 RX ORDER — PHENAZOPYRIDINE HYDROCHLORIDE 100 MG/1
200 TABLET, FILM COATED ORAL
Status: DISCONTINUED | OUTPATIENT
Start: 2023-06-26 | End: 2023-06-28 | Stop reason: HOSPADM

## 2023-06-26 RX ORDER — MAGNESIUM HYDROXIDE/ALUMINUM HYDROXICE/SIMETHICONE 120; 1200; 1200 MG/30ML; MG/30ML; MG/30ML
30 SUSPENSION ORAL EVERY 6 HOURS PRN
Status: DISCONTINUED | OUTPATIENT
Start: 2023-06-26 | End: 2023-06-28 | Stop reason: HOSPADM

## 2023-06-26 RX ORDER — MIDAZOLAM HYDROCHLORIDE 2 MG/2ML
INJECTION, SOLUTION INTRAMUSCULAR; INTRAVENOUS AS NEEDED
Status: DISCONTINUED | OUTPATIENT
Start: 2023-06-26 | End: 2023-06-26

## 2023-06-26 RX ORDER — HYDROMORPHONE HCL/PF 1 MG/ML
0.4 SYRINGE (ML) INJECTION
Status: DISCONTINUED | OUTPATIENT
Start: 2023-06-26 | End: 2023-06-26 | Stop reason: HOSPADM

## 2023-06-26 RX ORDER — FENTANYL CITRATE 50 UG/ML
INJECTION, SOLUTION INTRAMUSCULAR; INTRAVENOUS AS NEEDED
Status: DISCONTINUED | OUTPATIENT
Start: 2023-06-26 | End: 2023-06-26

## 2023-06-26 RX ADMIN — HYDROMORPHONE HYDROCHLORIDE 0.5 MG: 1 INJECTION, SOLUTION INTRAMUSCULAR; INTRAVENOUS; SUBCUTANEOUS at 19:06

## 2023-06-26 RX ADMIN — Medication 200 MG: at 12:21

## 2023-06-26 RX ADMIN — LIDOCAINE HYDROCHLORIDE 50 MG: 10 INJECTION, SOLUTION EPIDURAL; INFILTRATION; INTRACAUDAL; PERINEURAL at 12:21

## 2023-06-26 RX ADMIN — PROPOFOL 50 MCG/KG/MIN: 10 INJECTION, EMULSION INTRAVENOUS at 12:22

## 2023-06-26 RX ADMIN — OXYCODONE HYDROCHLORIDE 10 MG: 10 TABLET ORAL at 18:00

## 2023-06-26 RX ADMIN — ROCURONIUM BROMIDE 100 MG: 10 SOLUTION INTRAVENOUS at 12:27

## 2023-06-26 RX ADMIN — HYDROMORPHONE HYDROCHLORIDE 0.2 MG: 1 INJECTION, SOLUTION INTRAMUSCULAR; INTRAVENOUS; SUBCUTANEOUS at 12:57

## 2023-06-26 RX ADMIN — HEPARIN SODIUM 5000 UNITS: 5000 INJECTION INTRAVENOUS; SUBCUTANEOUS at 23:07

## 2023-06-26 RX ADMIN — CEFAZOLIN SODIUM 2000 MG: 2 SOLUTION INTRAVENOUS at 19:30

## 2023-06-26 RX ADMIN — OXYBUTYNIN CHLORIDE 5 MG: 5 TABLET ORAL at 19:37

## 2023-06-26 RX ADMIN — FENTANYL CITRATE 50 MCG: 50 INJECTION, SOLUTION INTRAMUSCULAR; INTRAVENOUS at 12:21

## 2023-06-26 RX ADMIN — ROCURONIUM BROMIDE 10 MG: 10 SOLUTION INTRAVENOUS at 14:36

## 2023-06-26 RX ADMIN — Medication 20 MG: at 13:35

## 2023-06-26 RX ADMIN — CEFAZOLIN SODIUM 2000 MG: 2 SOLUTION INTRAVENOUS at 12:25

## 2023-06-26 RX ADMIN — FUROSEMIDE 20 MG: 10 INJECTION, SOLUTION INTRAMUSCULAR; INTRAVENOUS at 14:43

## 2023-06-26 RX ADMIN — PHENYLEPHRINE HYDROCHLORIDE 50 MCG/MIN: 10 INJECTION INTRAVENOUS at 12:22

## 2023-06-26 RX ADMIN — SODIUM CHLORIDE: 0.9 INJECTION, SOLUTION INTRAVENOUS at 15:02

## 2023-06-26 RX ADMIN — HYDROMORPHONE HYDROCHLORIDE 0.2 MG: 1 INJECTION, SOLUTION INTRAMUSCULAR; INTRAVENOUS; SUBCUTANEOUS at 13:45

## 2023-06-26 RX ADMIN — HYDROMORPHONE HYDROCHLORIDE 0.2 MG: 1 INJECTION, SOLUTION INTRAMUSCULAR; INTRAVENOUS; SUBCUTANEOUS at 13:39

## 2023-06-26 RX ADMIN — GLYCOPYRROLATE 0.1 MG: 0.2 INJECTION, SOLUTION INTRAMUSCULAR; INTRAVENOUS at 12:10

## 2023-06-26 RX ADMIN — HYDROMORPHONE HYDROCHLORIDE 0.4 MG: 1 INJECTION, SOLUTION INTRAMUSCULAR; INTRAVENOUS; SUBCUTANEOUS at 12:38

## 2023-06-26 RX ADMIN — PHENAZOPYRIDINE 200 MG: 100 TABLET ORAL at 17:56

## 2023-06-26 RX ADMIN — ONDANSETRON 4 MG: 2 INJECTION INTRAMUSCULAR; INTRAVENOUS at 12:30

## 2023-06-26 RX ADMIN — SODIUM CHLORIDE, SODIUM LACTATE, POTASSIUM CHLORIDE, AND CALCIUM CHLORIDE 125 ML/HR: .6; .31; .03; .02 INJECTION, SOLUTION INTRAVENOUS at 18:01

## 2023-06-26 RX ADMIN — MIDAZOLAM HYDROCHLORIDE 2 MG: 1 INJECTION, SOLUTION INTRAMUSCULAR; INTRAVENOUS at 12:10

## 2023-06-26 RX ADMIN — TRANEXAMIC ACID 1000 MG: 10 INJECTION, SOLUTION INTRAVENOUS at 13:55

## 2023-06-26 RX ADMIN — PROPOFOL 200 MG: 10 INJECTION, EMULSION INTRAVENOUS at 12:21

## 2023-06-26 RX ADMIN — ACETAMINOPHEN 650 MG: 325 TABLET, FILM COATED ORAL at 23:07

## 2023-06-26 RX ADMIN — HYDROMORPHONE HYDROCHLORIDE 0.2 MG: 1 INJECTION, SOLUTION INTRAMUSCULAR; INTRAVENOUS; SUBCUTANEOUS at 15:10

## 2023-06-26 RX ADMIN — FENTANYL CITRATE 50 MCG: 50 INJECTION INTRAMUSCULAR; INTRAVENOUS at 16:26

## 2023-06-26 RX ADMIN — SODIUM CHLORIDE, SODIUM LACTATE, POTASSIUM CHLORIDE, AND CALCIUM CHLORIDE: .6; .31; .03; .02 INJECTION, SOLUTION INTRAVENOUS at 15:23

## 2023-06-26 RX ADMIN — FENTANYL CITRATE 50 MCG: 50 INJECTION, SOLUTION INTRAMUSCULAR; INTRAVENOUS at 12:35

## 2023-06-26 RX ADMIN — HEPARIN SODIUM 5000 UNITS: 5000 INJECTION INTRAVENOUS; SUBCUTANEOUS at 17:57

## 2023-06-26 RX ADMIN — PROPOFOL 50 MG: 10 INJECTION, EMULSION INTRAVENOUS at 15:20

## 2023-06-26 RX ADMIN — ACETAMINOPHEN 650 MG: 325 TABLET, FILM COATED ORAL at 17:56

## 2023-06-26 RX ADMIN — HYDROMORPHONE HYDROCHLORIDE 0.2 MG: 1 INJECTION, SOLUTION INTRAMUSCULAR; INTRAVENOUS; SUBCUTANEOUS at 15:17

## 2023-06-26 RX ADMIN — SODIUM CHLORIDE, SODIUM LACTATE, POTASSIUM CHLORIDE, AND CALCIUM CHLORIDE: .6; .31; .03; .02 INJECTION, SOLUTION INTRAVENOUS at 11:34

## 2023-06-26 RX ADMIN — Medication 20 MG: at 12:27

## 2023-06-26 RX ADMIN — SODIUM CHLORIDE: 0.9 INJECTION, SOLUTION INTRAVENOUS at 15:30

## 2023-06-26 RX ADMIN — SODIUM CHLORIDE: 0.9 INJECTION, SOLUTION INTRAVENOUS at 12:22

## 2023-06-26 RX ADMIN — DOCUSATE SODIUM 100 MG: 100 CAPSULE, LIQUID FILLED ORAL at 17:57

## 2023-06-26 RX ADMIN — DEXAMETHASONE SODIUM PHOSPHATE 10 MG: 10 INJECTION, SOLUTION INTRAMUSCULAR; INTRAVENOUS at 12:31

## 2023-06-26 RX ADMIN — Medication 10 MG: at 14:10

## 2023-06-26 RX ADMIN — OXYCODONE HYDROCHLORIDE 10 MG: 10 TABLET ORAL at 23:07

## 2023-06-26 NOTE — ANESTHESIA POSTPROCEDURE EVALUATION
Post-Op Assessment Note    CV Status:  Stable    Pain management: adequate     Mental Status:  Sleepy and lethargic   Hydration Status:  Stable   PONV Controlled:  None   Airway Patency:  Patent      Post Op Vitals Reviewed: Yes            No notable events documented      BP   126/78   Temp      Pulse  86   Resp   13   SpO2   99

## 2023-06-26 NOTE — INTERVAL H&P NOTE
H&P reviewed. After examining the patient I find no changes in the patients condition since the H&P had been written.     Vitals:    06/26/23 1118   BP: 126/74   Pulse: 74   Resp: 15   Temp: 98 °F (36.7 °C)   SpO2: 97%   Proceed to radical prostatectomy with possible pelvic lymph node dissection, robot assisted laparoscopic approach

## 2023-06-26 NOTE — PLAN OF CARE
Problem: MOBILITY - ADULT  Goal: Maintain or return to baseline ADL function  Description: INTERVENTIONS:  -  Assess patient's ability to carry out ADLs; assess patient's baseline for ADL function and identify physical deficits which impact ability to perform ADLs (bathing, care of mouth/teeth, toileting, grooming, dressing, etc.)  - Assess/evaluate cause of self-care deficits   - Assess range of motion  - Assess patient's mobility; develop plan if impaired  - Assess patient's need for assistive devices and provide as appropriate  - Encourage maximum independence but intervene and supervise when necessary  - Involve family in performance of ADLs  - Assess for home care needs following discharge   - Consider OT consult to assist with ADL evaluation and planning for discharge  - Provide patient education as appropriate  Outcome: Progressing  Goal: Maintains/Returns to pre admission functional level  Description: INTERVENTIONS:  - Perform BMAT or MOVE assessment daily.   - Set and communicate daily mobility goal to care team and patient/family/caregiver.    - Collaborate with rehabilitation services on mobility goals if consulted  - Out of bed for toileting  - Record patient progress and toleration of activity level   Outcome: Progressing     Problem: PAIN - ADULT  Goal: Verbalizes/displays adequate comfort level or baseline comfort level  Description: Interventions:  - Encourage patient to monitor pain and request assistance  - Assess pain using appropriate pain scale  - Administer analgesics based on type and severity of pain and evaluate response  - Implement non-pharmacological measures as appropriate and evaluate response  - Consider cultural and social influences on pain and pain management  - Notify physician/advanced practitioner if interventions unsuccessful or patient reports new pain  Outcome: Progressing     Problem: SAFETY ADULT  Goal: Maintain or return to baseline ADL function  Description: INTERVENTIONS:  -  Assess patient's ability to carry out ADLs; assess patient's baseline for ADL function and identify physical deficits which impact ability to perform ADLs (bathing, care of mouth/teeth, toileting, grooming, dressing, etc.)  - Assess/evaluate cause of self-care deficits   - Assess range of motion  - Assess patient's mobility; develop plan if impaired  - Assess patient's need for assistive devices and provide as appropriate  - Encourage maximum independence but intervene and supervise when necessary  - Involve family in performance of ADLs  - Assess for home care needs following discharge   - Consider OT consult to assist with ADL evaluation and planning for discharge  - Provide patient education as appropriate  Outcome: Progressing  Goal: Maintains/Returns to pre admission functional level  Description: INTERVENTIONS:  - Perform BMAT or MOVE assessment daily.   - Set and communicate daily mobility goal to care team and patient/family/caregiver.    - Collaborate with rehabilitation services on mobility goals if consulted  - Out of bed for toileting  - Record patient progress and toleration of activity level   Outcome: Progressing  Goal: Patient will remain free of falls  Description: INTERVENTIONS:  - Educate patient/family on patient safety including physical limitations  - Instruct patient to call for assistance with activity   - Consult OT/PT to assist with strengthening/mobility   - Keep Call bell within reach  - Keep bed low and locked with side rails adjusted as appropriate  - Keep care items and personal belongings within reach  - Initiate and maintain comfort rounds  - Make Fall Risk Sign visible to staff  - Apply yellow socks and bracelet for high fall risk patients  - Consider moving patient to room near nurses station  Outcome: Progressing     Problem: DISCHARGE PLANNING  Goal: Discharge to home or other facility with appropriate resources  Description: INTERVENTIONS:  - Identify barriers to discharge w/patient and caregiver  - Arrange for needed discharge resources and transportation as appropriate  - Identify discharge learning needs (meds, wound care, etc.)  - Arrange for interpretive services to assist at discharge as needed  - Refer to Case Management Department for coordinating discharge planning if the patient needs post-hospital services based on physician/advanced practitioner order or complex needs related to functional status, cognitive ability, or social support system  Outcome: Progressing     Problem: Knowledge Deficit  Goal: Patient/family/caregiver demonstrates understanding of disease process, treatment plan, medications, and discharge instructions  Description: Complete learning assessment and assess knowledge base.   Interventions:  - Provide teaching at level of understanding  - Provide teaching via preferred learning methods  Outcome: Progressing

## 2023-06-26 NOTE — OP NOTE
OPERATIVE REPORT  PATIENT NAME: Isidoro Chu Apgar    :  1960  MRN: [id number]  Pt Location: AN OR ROOM 04    SURGERY DATE: 2023    Surgeon(s) and Role:     * Rosalio Moser MD - Primary     * Florin Melton PA-C - Assisting    Please note that a PA was necessary for changing robotic instruments and exposing the candidate anatomy    Preop Diagnosis:  Prostate cancer ([address]) [place]    Post-Op Diagnosis Codes:     * Prostate cancer ([address]) [place]    Procedure(s):  PROSTATECTOMY RADICAL LAPAROSCOPIC W/ ROBOTICS    Specimen(s):  ID Type Source Tests Collected by Time Destination   1 : PERIPROSTATIC FAT  Tissue Soft Tissue, Other TISSUE EXAM Rosalio Moser MD 2023 1319    2 : PROSTATE AND BILATERAL SEMINAL VESICLES  Tissue Prostate TISSUE EXAM Rosalio Moser MD 2023 1420        Estimated Blood Loss:   Minimal    Drains:  Closed/Suction Drain Left Perineal Bulb 19 Fr. (Active)   Number of days: 0       Urethral Catheter Latex; Other (Comment) 15 Fr. (Active)   Number of days: 0       [REMOVED] Urethral Catheter Latex 18 Fr. (Removed)   Number of days: 0       Anesthesia Type:   General    Operative Indications:  Prostate cancer ([address]) [C61]      Operative Findings:  Desmoplastic reaction around the pedicles bilaterally, the base of prostate is near to the ureteral orifices. A modified everett catheter was placed with an extra drainage hole to maximally drain the anastomosis. No lymph node dissection performed given risk parameters and operative findings with low risk PSA    Complications:   None    Procedure and Technique:  He was seen in the office for his intermediate risk prostate cancer and counseled on radical prostatectomy and a robot-assisted laparoscopic fashion. Dissipated in the shared/inform decision-making model and wished to proceed with surgery. He was prepared for surgery and seen by the preoperative nursing and anesthesia team on the day of surgery.     He was brought to the operating room and placed under general endotracheal anesthesia. Preoperative antibiotics were given. Appropriate intravenous access was obtained. He was positioned on the pink pad positioning system taking care to pad all pressure points. He was then prepared and draped after a tilt test was performed. Sequential compression devices were in place. A timeout confirm the proper patient position and procedure. A spring-loaded entry device was used to establish pneumoperitoneum. This was done to 15 mmHg. Ports were then placed as is customary for robotic pelvic surgery with a total of 4, 8 mm robotic ports and a 12 mm assistant port and a 5 mm assistant port as well. The pneumoperitoneum was lowered to 10 mmHg for the duration of today's case and the robotic surgical system was docked. The sigmoid colon is seen to be inflamed and redundant, consistent with a history of diverticulosis. Cold scissors were used to divide adhesions to the left pelvic sidewall to allow for exposure of the posterior surface of the bladder. The peritoneum overlying the bladder and seminal vesicles and vasa deferentia was scored with monopolar electrocautery and the vas deferens and seminal vesicles were dissected bilaterally. There is seen to be desmoplasia in the plane of the recto prostatic fascia. This was divided with cold scissors and blunt dissection. The anterior dissection was then performed dropping the bladder posteriorly and removing the fat anterior to the prostate. This was sent for periprostatic fat. The endopelvic fascia was opened on the right and then the left. The puboprostatic ligaments were divided. The patient is seen to have a unusually thick pillar of levator muscle bilaterally which exists in a sweater overpants fashion over the apex of the prostate which is seen to be quite distal.  The pillar of levator muscle was divided with cold scissors and swept away distally.     A figure-of-eight Vicryl suture was used to control the dorsal venous complex. The bladder neck dissection was then performed by placing traction on the bladder to help delineate the junction between the bladder and prostate. This was divided anteriorly and posteriorly. The base of the prostate is seen to be quite close to the ureteral orifices. These are visualized but not damaged during the posterior bladder neck dissection. The previously dissected posterior structures were delivered through the hole in the posterior bladder neck for dissection of the prostatic pedicles. The prostatic pedicles are seen to be thicker than usual with a great amount of inflammation in this area. The bilateral nerve spare was previously performed prior to this maneuver to move away the cavernous nerve bundles. The right and left vascular pedicles were taken with a combination of bipolar cautery as well as cold scissors. The snip and push technique was used to help further develop the rectal prostatic fascia taking care to stay away from the anterior surface of the rectum. No rectal injury was noted during today's dissection. The dorsal venous complex was then divided from the apex of the prostate. This tissue is seen to be reactive and sticky and reactive. The urethral stump was then dissected with cold scissors and the anterior urethra and then posterior urethra were then section. The specimen was then placed in an Endo Catch bag for later retrieval.    A matrix of gelatin and thrombin particles was placed over the prostatic pedicles bilaterally for additional hemostasis. Given the patient's Kinjal 7 disease and low risk PSA the decision was made to not perform a pelvic lymph node dissection today given the controversial data regarding potential benefit or lack thereof for patients with similar disease parameters.     A self tensioning suture was used bilaterally starting at the posterior bladder neck and moving clockwise and counterclockwise, respectively, to perform a vesicourethral anastomosis. Care was taken to avoid the ureteral orifices with the bites taken for vesicourethral anastomosis. Clear urine was seen to be exiting from both orifices prior to completion of the vesicourethral anastomosis. When irrigated, there is a slight leak of irrigant anteriorly which is low in volume. I did not believe that the potential risks of redoing the anastomosis were worth any small benefit to redo the anastomosis given that this was difficult due to the proximity of the ureteral orifices. A modified Herndon catheter was placed with an additional drainage hole for maximal drainage of the vesicourethral anastomosis. Further matrix of gelatin particles and thrombin was placed over the dorsal venous complex. A close suction drain was placed. The ports were then looked out. No hemorrhage was noted from the abdominal wall. The specimen, this being prostate and seminal vesicles was delivered through extending the supraumbilical incision. The fascia was then closed with figure-of-eight PDS suture. The fascia of the 12 mm assistant port was also closed. The skin was closed with interrupted horizontal mattress sutures of 4-0 Monocryl. The patient was then awakened from anesthesia. All instrument counts and sponge counts were correct. A postoperative debrief was performed. I was present for the entire procedure., A qualified resident physician was not available. and A physician assistant was required during the procedure for retraction, tissue handling, dissection and suturing.     Patient Disposition:  PACU         SIGNATURE: Lizette Bryant MD  DATE: June 26, 2023  TIME: 3:42 PM

## 2023-06-26 NOTE — TELEPHONE ENCOUNTER
Status post radical prostatectomy  Please be sure that he keeps his catheter a full 2 weeks  I will see him in 3 to 4 weeks for pathology review    We will check a PSA in 3 months

## 2023-06-26 NOTE — ANESTHESIA PREPROCEDURE EVALUATION
Procedure:  PROSTATECTOMY RADICAL AND PELVIC LYMPH NODE DISSECTION LAPAROSCOPIC W/ ROBOTICS (Abdomen)    Relevant Problems   CARDIO   (+) Atrial fibrillation (HCC)      /RENAL   (+) Prostate cancer (HCC)      PULMONARY   (+) Sleep apnea      Other   (+) CPAP (continuous positive airway pressure) dependence   (+) H/O cardiac radiofrequency ablation      Asthma        in younger years   • Atrial fibrillation ([facility] [address] )     • Basal cell carcinoma 04/2023     left cheek   • BPH (benign prostatic hypertrophy) 09/2020   • CPAP (continuous positive airway pressure) dependence     • H/O cardiac radiofrequency ablation     • Hard to intubate 2014     given card but lost it   • History of transfusion     • Sleep apnea        Physical Exam    Airway    Mallampati score: III  TM Distance: >3 FB  Neck ROM: full     Dental       Cardiovascular  Cardiovascular exam normal    Pulmonary  Pulmonary exam normal     Other Findings        Anesthesia Plan  ASA Score- 2     Anesthesia Type- general with ASA Monitors  Additional Monitors:   Airway Plan: ETT  Plan Factors-Exercise tolerance (METS): >4 METS  Chart reviewed  EKG reviewed  Imaging results reviewed  Existing labs reviewed  Patient summary reviewed  Induction- intravenous  Postoperative Plan- Plan for postoperative opioid use  Planned trial extubation    Informed Consent- Anesthetic plan and risks discussed with patient  I personally reviewed this patient with the CRNA  Discussed and agreed on the Anesthesia Plan with the CRNA  Ford Beasley

## 2023-06-27 LAB
ANION GAP SERPL CALCULATED.3IONS-SCNC: 8 MMOL/L
BUN SERPL-MCNC: 16 MG/DL (ref 5–25)
CALCIUM SERPL-MCNC: 8.4 MG/DL (ref 8.4–10.2)
CHLORIDE SERPL-SCNC: 104 MMOL/L (ref 96–108)
CO2 SERPL-SCNC: 24 MMOL/L (ref 21–32)
CREAT SERPL-MCNC: 1.07 MG/DL (ref 0.6–1.3)
ERYTHROCYTE [DISTWIDTH] IN BLOOD BY AUTOMATED COUNT: 12.7 % (ref 11.6–15.1)
GFR SERPL CREATININE-BSD FRML MDRD: 74 ML/MIN/1.73SQ M
GLUCOSE P FAST SERPL-MCNC: 131 MG/DL (ref 65–99)
GLUCOSE SERPL-MCNC: 131 MG/DL (ref 65–140)
HCT VFR BLD AUTO: 44 % (ref 36.5–49.3)
HGB BLD-MCNC: 15 G/DL (ref 12–17)
MCH RBC QN AUTO: 31.3 PG (ref 26.8–34.3)
MCHC RBC AUTO-ENTMCNC: 34.1 G/DL (ref 31.4–37.4)
MCV RBC AUTO: 92 FL (ref 82–98)
PLATELET # BLD AUTO: 232 THOUSANDS/UL (ref 149–390)
PMV BLD AUTO: 10.2 FL (ref 8.9–12.7)
POTASSIUM SERPL-SCNC: 4.1 MMOL/L (ref 3.5–5.3)
RBC # BLD AUTO: 4.79 MILLION/UL (ref 3.88–5.62)
SODIUM SERPL-SCNC: 136 MMOL/L (ref 135–147)
WBC # BLD AUTO: 15.64 THOUSAND/UL (ref 4.31–10.16)

## 2023-06-27 PROCEDURE — 99024 POSTOP FOLLOW-UP VISIT: CPT

## 2023-06-27 PROCEDURE — 80048 BASIC METABOLIC PNL TOTAL CA: CPT | Performed by: UROLOGY

## 2023-06-27 PROCEDURE — 85027 COMPLETE CBC AUTOMATED: CPT | Performed by: UROLOGY

## 2023-06-27 RX ADMIN — HYDROMORPHONE HYDROCHLORIDE 0.5 MG: 1 INJECTION, SOLUTION INTRAMUSCULAR; INTRAVENOUS; SUBCUTANEOUS at 08:12

## 2023-06-27 RX ADMIN — ACETAMINOPHEN 650 MG: 325 TABLET, FILM COATED ORAL at 05:20

## 2023-06-27 RX ADMIN — PHENAZOPYRIDINE 200 MG: 100 TABLET ORAL at 17:36

## 2023-06-27 RX ADMIN — DOCUSATE SODIUM 100 MG: 100 CAPSULE, LIQUID FILLED ORAL at 17:35

## 2023-06-27 RX ADMIN — PHENAZOPYRIDINE 200 MG: 100 TABLET ORAL at 06:32

## 2023-06-27 RX ADMIN — SENNOSIDES 8.6 MG: 8.6 TABLET, FILM COATED ORAL at 08:10

## 2023-06-27 RX ADMIN — OXYCODONE HYDROCHLORIDE 10 MG: 10 TABLET ORAL at 10:13

## 2023-06-27 RX ADMIN — ALLOPURINOL 100 MG: 100 TABLET ORAL at 08:10

## 2023-06-27 RX ADMIN — DOCUSATE SODIUM 100 MG: 100 CAPSULE, LIQUID FILLED ORAL at 08:10

## 2023-06-27 RX ADMIN — SODIUM CHLORIDE, SODIUM LACTATE, POTASSIUM CHLORIDE, AND CALCIUM CHLORIDE 125 ML/HR: .6; .31; .03; .02 INJECTION, SOLUTION INTRAVENOUS at 03:43

## 2023-06-27 RX ADMIN — OXYCODONE HYDROCHLORIDE 5 MG: 5 TABLET ORAL at 05:21

## 2023-06-27 RX ADMIN — PHENAZOPYRIDINE 200 MG: 100 TABLET ORAL at 11:39

## 2023-06-27 RX ADMIN — CEFAZOLIN SODIUM 2000 MG: 2 SOLUTION INTRAVENOUS at 03:43

## 2023-06-27 RX ADMIN — HEPARIN SODIUM 5000 UNITS: 5000 INJECTION INTRAVENOUS; SUBCUTANEOUS at 05:20

## 2023-06-27 RX ADMIN — ACETAMINOPHEN 650 MG: 325 TABLET, FILM COATED ORAL at 23:34

## 2023-06-27 RX ADMIN — HEPARIN SODIUM 5000 UNITS: 5000 INJECTION INTRAVENOUS; SUBCUTANEOUS at 23:34

## 2023-06-27 RX ADMIN — HYDROMORPHONE HYDROCHLORIDE 0.5 MG: 1 INJECTION, SOLUTION INTRAMUSCULAR; INTRAVENOUS; SUBCUTANEOUS at 00:38

## 2023-06-27 RX ADMIN — HEPARIN SODIUM 5000 UNITS: 5000 INJECTION INTRAVENOUS; SUBCUTANEOUS at 13:44

## 2023-06-27 RX ADMIN — OXYCODONE HYDROCHLORIDE 5 MG: 5 TABLET ORAL at 17:35

## 2023-06-27 RX ADMIN — OXYCODONE HYDROCHLORIDE 10 MG: 10 TABLET ORAL at 23:34

## 2023-06-27 RX ADMIN — ACETAMINOPHEN 650 MG: 325 TABLET, FILM COATED ORAL at 17:35

## 2023-06-27 RX ADMIN — ACETAMINOPHEN 650 MG: 325 TABLET, FILM COATED ORAL at 11:39

## 2023-06-27 NOTE — PLAN OF CARE
Problem: MOBILITY - ADULT  Goal: Maintain or return to baseline ADL function  Description: INTERVENTIONS:  -  Assess patient's ability to carry out ADLs; assess patient's baseline for ADL function and identify physical deficits which impact ability to perform ADLs (bathing, care of mouth/teeth, toileting, grooming, dressing, etc.)  - Assess/evaluate cause of self-care deficits   - Assess range of motion  - Assess patient's mobility; develop plan if impaired  - Assess patient's need for assistive devices and provide as appropriate  - Encourage maximum independence but intervene and supervise when necessary  - Involve family in performance of ADLs  - Assess for home care needs following discharge   - Consider OT consult to assist with ADL evaluation and planning for discharge  - Provide patient education as appropriate  Outcome: Progressing  Goal: Maintains/Returns to pre admission functional level  Description: INTERVENTIONS:  - Perform BMAT or MOVE assessment daily.   - Set and communicate daily mobility goal to care team and patient/family/caregiver. - Collaborate with rehabilitation services on mobility goals if consulted  - Perform Range of Motion 2 times a day. - Reposition patient every 2 hours.   - Dangle patient 2 times a day  - Stand patient 2 times a day  - Ambulate patient 2 times a day  - Out of bed to chair 2 times a day   - Out of bed for meals 2 times a day  - Out of bed for toileting  - Record patient progress and toleration of activity level   Outcome: Progressing     Problem: PAIN - ADULT  Goal: Verbalizes/displays adequate comfort level or baseline comfort level  Description: Interventions:  - Encourage patient to monitor pain and request assistance  - Assess pain using appropriate pain scale  - Administer analgesics based on type and severity of pain and evaluate response  - Implement non-pharmacological measures as appropriate and evaluate response  - Consider cultural and social influences on pain and pain management  - Notify physician/advanced practitioner if interventions unsuccessful or patient reports new pain  Outcome: Progressing     Problem: SAFETY ADULT  Goal: Maintain or return to baseline ADL function  Description: INTERVENTIONS:  -  Assess patient's ability to carry out ADLs; assess patient's baseline for ADL function and identify physical deficits which impact ability to perform ADLs (bathing, care of mouth/teeth, toileting, grooming, dressing, etc.)  - Assess/evaluate cause of self-care deficits   - Assess range of motion  - Assess patient's mobility; develop plan if impaired  - Assess patient's need for assistive devices and provide as appropriate  - Encourage maximum independence but intervene and supervise when necessary  - Involve family in performance of ADLs  - Assess for home care needs following discharge   - Consider OT consult to assist with ADL evaluation and planning for discharge  - Provide patient education as appropriate  Outcome: Progressing  Goal: Maintains/Returns to pre admission functional level  Description: INTERVENTIONS:  - Perform BMAT or MOVE assessment daily.   - Set and communicate daily mobility goal to care team and patient/family/caregiver. - Collaborate with rehabilitation services on mobility goals if consulted  - Perform Range of Motion 2 times a day. - Reposition patient every 2 hours.   - Dangle patient 2 times a day  - Stand patient 2 times a day  - Ambulate patient 2 times a day  - Out of bed to chair 2 times a day   - Out of bed for meals 2 times a day  - Out of bed for toileting  - Record patient progress and toleration of activity level   Outcome: Progressing  Goal: Patient will remain free of falls  Description: INTERVENTIONS:  - Educate patient/family on patient safety including physical limitations  - Instruct patient to call for assistance with activity   - Consult OT/PT to assist with strengthening/mobility   - Keep Call bell within reach  - Keep bed low and locked with side rails adjusted as appropriate  - Keep care items and personal belongings within reach  - Initiate and maintain comfort rounds  - Make Fall Risk Sign visible to staff  - Offer Toileting every 2 Hours, in advance of need  - Initiate/Maintain bed alarm  - Obtain necessary fall risk management equipment  - Apply yellow socks and bracelet for high fall risk patients  - Consider moving patient to room near nurses station  Outcome: Progressing     Problem: DISCHARGE PLANNING  Goal: Discharge to home or other facility with appropriate resources  Description: INTERVENTIONS:  - Identify barriers to discharge w/patient and caregiver  - Arrange for needed discharge resources and transportation as appropriate  - Identify discharge learning needs (meds, wound care, etc.)  - Arrange for interpretive services to assist at discharge as needed  - Refer to Case Management Department for coordinating discharge planning if the patient needs post-hospital services based on physician/advanced practitioner order or complex needs related to functional status, cognitive ability, or social support system  Outcome: Progressing     Problem: Knowledge Deficit  Goal: Patient/family/caregiver demonstrates understanding of disease process, treatment plan, medications, and discharge instructions  Description: Complete learning assessment and assess knowledge base.   Interventions:  - Provide teaching at level of understanding  - Provide teaching via preferred learning methods  Outcome: Progressing

## 2023-06-27 NOTE — PLAN OF CARE
Problem: MOBILITY - ADULT  Goal: Maintain or return to baseline ADL function  Description: INTERVENTIONS:  -  Assess patient's ability to carry out ADLs; assess patient's baseline for ADL function and identify physical deficits which impact ability to perform ADLs (bathing, care of mouth/teeth, toileting, grooming, dressing, etc.)  - Assess/evaluate cause of self-care deficits   - Assess range of motion  - Assess patient's mobility; develop plan if impaired  - Assess patient's need for assistive devices and provide as appropriate  - Encourage maximum independence but intervene and supervise when necessary  - Involve family in performance of ADLs  - Assess for home care needs following discharge   - Consider OT consult to assist with ADL evaluation and planning for discharge  - Provide patient education as appropriate  Outcome: Progressing  Goal: Maintains/Returns to pre admission functional level  Description: INTERVENTIONS:  - Perform BMAT or MOVE assessment daily.   - Set and communicate daily mobility goal to care team and patient/family/caregiver. - Collaborate with rehabilitation services on mobility goals if consulted  - Perform Range of Motion 3 times a day. - Reposition patient every 2 hours.   - Dangle patient 3 times a day  - Stand patient 3 times a day  - Ambulate patient 3 times a day  - Out of bed to chair 3 times a day   - Out of bed for meals 3 times a day  - Out of bed for toileting  - Record patient progress and toleration of activity level   Outcome: Progressing     Problem: PAIN - ADULT  Goal: Verbalizes/displays adequate comfort level or baseline comfort level  Description: Interventions:  - Encourage patient to monitor pain and request assistance  - Assess pain using appropriate pain scale  - Administer analgesics based on type and severity of pain and evaluate response  - Implement non-pharmacological measures as appropriate and evaluate response  - Consider cultural and social influences on pain and pain management  - Notify physician/advanced practitioner if interventions unsuccessful or patient reports new pain  Outcome: Progressing     Problem: SAFETY ADULT  Goal: Maintain or return to baseline ADL function  Description: INTERVENTIONS:  -  Assess patient's ability to carry out ADLs; assess patient's baseline for ADL function and identify physical deficits which impact ability to perform ADLs (bathing, care of mouth/teeth, toileting, grooming, dressing, etc.)  - Assess/evaluate cause of self-care deficits   - Assess range of motion  - Assess patient's mobility; develop plan if impaired  - Assess patient's need for assistive devices and provide as appropriate  - Encourage maximum independence but intervene and supervise when necessary  - Involve family in performance of ADLs  - Assess for home care needs following discharge   - Consider OT consult to assist with ADL evaluation and planning for discharge  - Provide patient education as appropriate  Outcome: Progressing  Goal: Maintains/Returns to pre admission functional level  Description: INTERVENTIONS:  - Perform BMAT or MOVE assessment daily.   - Set and communicate daily mobility goal to care team and patient/family/caregiver. - Collaborate with rehabilitation services on mobility goals if consulted  - Perform Range of Motion 3 times a day. - Reposition patient every 2 hours.   - Dangle patient 3 times a day  - Stand patient 3 times a day  - Ambulate patient 3 times a day  - Out of bed to chair 3 times a day   - Out of bed for meals 3 times a day  - Out of bed for toileting  - Record patient progress and toleration of activity level   Outcome: Progressing  Goal: Patient will remain free of falls  Description: INTERVENTIONS:  - Educate patient/family on patient safety including physical limitations  - Instruct patient to call for assistance with activity   - Consult OT/PT to assist with strengthening/mobility   - Keep Call bell within reach  - Keep bed low and locked with side rails adjusted as appropriate  - Keep care items and personal belongings within reach  - Initiate and maintain comfort rounds  - Make Fall Risk Sign visible to staff  - Offer Toileting every 2 Hours, in advance of need  - Initiate/Maintain alarm  - Obtain necessary fall risk management equipment  - Apply yellow socks and bracelet for high fall risk patients  - Consider moving patient to room near nurses station  Outcome: Progressing     Problem: DISCHARGE PLANNING  Goal: Discharge to home or other facility with appropriate resources  Description: INTERVENTIONS:  - Identify barriers to discharge w/patient and caregiver  - Arrange for needed discharge resources and transportation as appropriate  - Identify discharge learning needs (meds, wound care, etc.)  - Arrange for interpretive services to assist at discharge as needed  - Refer to Case Management Department for coordinating discharge planning if the patient needs post-hospital services based on physician/advanced practitioner order or complex needs related to functional status, cognitive ability, or social support system  Outcome: Progressing     Problem: Knowledge Deficit  Goal: Patient/family/caregiver demonstrates understanding of disease process, treatment plan, medications, and discharge instructions  Description: Complete learning assessment and assess knowledge base.   Interventions:  - Provide teaching at level of understanding  - Provide teaching via preferred learning methods  Outcome: Progressing

## 2023-06-27 NOTE — PROGRESS NOTES
Progress Note - Urology  [facility] Apgar 1960, 66 y.o. male MRN: [id number]    Unit/Bed#: S -John Encounter: [id number]      Assessment & Plan:  1. Prostate Cancer   - POD1 radical laparoscopic prostatectomy with robotics with Dr. Queta Mullins  - VSS, afebrile  - WBC 15.64, reactive  - Encourage ambulation  - Encourage incentive spirometry  - DEO output 30 ml from midnight to 7 am.  - 24 UOP 3050 mL  - Tolerating normal diet, no N/V. Not passing gas. - Reports moderate/severe abdominal pain. Abdomen with mild distention, diffuse tenderness. Significant tenderness on palpation. Bowel sounds present. Surgical incisions clean, dry.   - Encourage ambulation for gas pain. Pain regimen  - Will reassess pain in afternoon. If improvement, patient stable for discharge. Subjective:   HPI: Patient seen at bedside. Reports overnight went well. Tolerating normal diet, no nausea or vomiting. Reports significant abdominal pain. No SOB, chest pain. Review of Systems   Constitutional: Negative for chills and fever. Respiratory: Negative for cough and shortness of breath. Cardiovascular: Negative for chest pain and palpitations. Gastrointestinal: Positive for abdominal pain. Negative for vomiting. Genitourinary: Negative for dysuria and hematuria. Musculoskeletal: Negative for arthralgias and back pain. Skin: Negative for color change and rash. Neurological: Negative for seizures and syncope. All other systems reviewed and are negative. Objective:  Vitals: Blood pressure 124/73, pulse 85, temperature 98.2 °F (36.8 °C), resp. rate 16, height 6' 2" (1.88 m), weight 106 kg (233 lb 0.4 oz), SpO2 93 %. ,Body mass index is 29.92 kg/m². Physical Exam  Constitutional:       General: He is not in acute distress. Appearance: Normal appearance. He is normal weight. He is not ill-appearing or toxic-appearing. HENT:      Head: Normocephalic and atraumatic.       Right Ear: External ear normal.      Left Ear: External ear normal.      Nose: Nose normal.      Mouth/Throat:      Mouth: Mucous membranes are moist.   Eyes:      General: No scleral icterus. Conjunctiva/sclera: Conjunctivae normal.   Cardiovascular:      Rate and Rhythm: Normal rate and regular rhythm. Pulses: Normal pulses. Heart sounds: Normal heart sounds. No murmur heard. No friction rub. No gallop. Pulmonary:      Effort: Pulmonary effort is normal. No respiratory distress. Breath sounds: Normal breath sounds. No stridor. No wheezing, rhonchi or rales. Abdominal:      Comments: Abdomen mild distention. Moderate/severe tenderness on palpation. Worse pain RLQ. No guarding. Bowel sounds present. Genitourinary:     Comments: Herndon in place draining pyridium tinged urine  Musculoskeletal:         General: Normal range of motion. Cervical back: Normal range of motion. Skin:     General: Skin is warm. Findings: No rash. Neurological:      General: No focal deficit present. Mental Status: He is alert and oriented to person, place, and time. Mental status is at baseline. Psychiatric:         Mood and Affect: Mood normal.         Behavior: Behavior normal.         Thought Content:  Thought content normal.         Judgment: Judgment normal.           Labs:  Recent Labs     06/26/23  1638 06/27/23  0528   WBC 16.80* 15.64*     Recent Labs     06/26/23  1638 06/27/23  0528   HGB 15.2 15.0     Recent Labs     06/26/23  1638 06/27/23  0528   HCT 45.1 44.0     Recent Labs     06/26/23  1638 06/27/23  0528   CREATININE 1.12 1.07       History:  Past Medical History:   Diagnosis Date   • Asthma     in younger years   • Atrial fibrillation ([address])    • Basal cell carcinoma 04/2023    left cheek   • BPH (benign prostatic hypertrophy) 09/2020   • CPAP (continuous positive airway pressure) dependence    • H/O cardiac radiofrequency ablation    • Hard to intubate 2014    given card but lost it   • History of transfusion    • Sleep apnea      Social History     Socioeconomic History   • Marital status: /Civil Union     Spouse name: None   • Number of children: None   • Years of education: None   • Highest education level: None   Occupational History   • None   Tobacco Use   • Smoking status: Never     Passive exposure: Past   • Smokeless tobacco: Never   Vaping Use   • Vaping Use: Never used   Substance and Sexual Activity   • Alcohol use:  Yes     Alcohol/week: 1.0 standard drink of alcohol     Types: 1 Cans of beer per week     Comment: rare   • Drug use: Never   • Sexual activity: Yes     Partners: Female     Birth control/protection: Inserts, Other   Other Topics Concern   • None   Social History Narrative   • None     Social Determinants of Health     Financial Resource Strain: Not on file   Food Insecurity: Not on file   Transportation Needs: Not on file   Physical Activity: Not on file   Stress: Not on file   Social Connections: Not on file   Intimate Partner Violence: Not on file   Housing Stability: Not on file     Past Surgical History:   Procedure Laterality Date   • APPENDECTOMY     • CARDIAC ELECTROPHYSIOLOGY STUDY AND ABLATION     • [facility],  [address]    Patent Ductus   • COLONOSCOPY     • MOHS SURGERY Left 04/12/2023    [facility] [place] [facility] [place]   • [place] BX PROSTATE STRTCTC SATURATION SAMPLING IMG GID N/A 05/09/2023    Procedure: TRANSPERINEAL MRI FUSION  BIOPSY PROSTATE;  Surgeon: Joe Sierra MD;  Location: [facility] MAIN OR;  Service: Urology   • TN LAPS SURG [id number] RPBIC RAD W/NRV SPARING ROBOT N/A 6/26/2023    Procedure: PROSTATECTOMY RADICAL LAPAROSCOPIC W/ ROBOTICS;  Surgeon: Merly Garzon MD;  Location: [facility] Main OR;  Service: Urology   • TONSILLECTOMY       Family History   Problem Relation Age of Onset   • Cancer Mother    • Cancer Father    • Diabetes Maternal Grandmother    • Cancer Maternal Grandmother    • Diabetes Paternal Grandmother    • Cancer Paternal Grandmother    • Cancer Paternal Grandfather Karen Hammond PA-C  Date: 6/27/2023 Time: 11:52 AM

## 2023-06-27 NOTE — PLAN OF CARE
Problem: MOBILITY - ADULT  Goal: Maintain or return to baseline ADL function  Description: INTERVENTIONS:  -  Assess patient's ability to carry out ADLs; assess patient's baseline for ADL function and identify physical deficits which impact ability to perform ADLs (bathing, care of mouth/teeth, toileting, grooming, dressing, etc.)  - Assess/evaluate cause of self-care deficits   - Assess range of motion  - Assess patient's mobility; develop plan if impaired  - Assess patient's need for assistive devices and provide as appropriate  - Encourage maximum independence but intervene and supervise when necessary  - Involve family in performance of ADLs  - Assess for home care needs following discharge   - Consider OT consult to assist with ADL evaluation and planning for discharge  - Provide patient education as appropriate  Outcome: Progressing  Goal: Maintains/Returns to pre admission functional level  Description: INTERVENTIONS:  - Perform BMAT or MOVE assessment daily.   - Set and communicate daily mobility goal to care team and patient/family/caregiver. - Collaborate with rehabilitation services on mobility goals if consulted  - Perform Range of Motion  times a day. - Reposition patient every  hours.   - Dangle patient  times a day  - Stand patient  times a day  - Ambulate patient  times a day  - Out of bed to chair  times a day   - Out of bed for meals  times a day  - Out of bed for toileting  - Record patient progress and toleration of activity level   Outcome: Progressing     Problem: PAIN - ADULT  Goal: Verbalizes/displays adequate comfort level or baseline comfort level  Description: Interventions:  - Encourage patient to monitor pain and request assistance  - Assess pain using appropriate pain scale  - Administer analgesics based on type and severity of pain and evaluate response  - Implement non-pharmacological measures as appropriate and evaluate response  - Consider cultural and social influences on pain and pain management  - Notify physician/advanced practitioner if interventions unsuccessful or patient reports new pain  Outcome: Progressing     Problem: SAFETY ADULT  Goal: Maintain or return to baseline ADL function  Description: INTERVENTIONS:  -  Assess patient's ability to carry out ADLs; assess patient's baseline for ADL function and identify physical deficits which impact ability to perform ADLs (bathing, care of mouth/teeth, toileting, grooming, dressing, etc.)  - Assess/evaluate cause of self-care deficits   - Assess range of motion  - Assess patient's mobility; develop plan if impaired  - Assess patient's need for assistive devices and provide as appropriate  - Encourage maximum independence but intervene and supervise when necessary  - Involve family in performance of ADLs  - Assess for home care needs following discharge   - Consider OT consult to assist with ADL evaluation and planning for discharge  - Provide patient education as appropriate  Outcome: Progressing  Goal: Maintains/Returns to pre admission functional level  Description: INTERVENTIONS:  - Perform BMAT or MOVE assessment daily.   - Set and communicate daily mobility goal to care team and patient/family/caregiver. - Collaborate with rehabilitation services on mobility goals if consulted  - Perform Range of Motion  times a day. - Reposition patient every  hours.   - Dangle patient  times a day  - Stand patient  times a day  - Ambulate patient  times a day  - Out of bed to chair  times a day   - Out of bed for meals  times a day  - Out of bed for toileting  - Record patient progress and toleration of activity level   Outcome: Progressing  Goal: Patient will remain free of falls  Description: INTERVENTIONS:  - Educate patient/family on patient safety including physical limitations  - Instruct patient to call for assistance with activity   - Consult OT/PT to assist with strengthening/mobility   - Keep Call bell within reach  - Keep bed low and locked with side rails adjusted as appropriate  - Keep care items and personal belongings within reach  - Initiate and maintain comfort rounds  - Make Fall Risk Sign visible to staff  - Offer Toileting every  Hours, in advance of need  - Initiate/Maintain alarm  - Obtain necessary fall risk management equipment  - Apply yellow socks and bracelet for high fall risk patients  - Consider moving patient to room near nurses station  Outcome: Progressing     Problem: DISCHARGE PLANNING  Goal: Discharge to home or other facility with appropriate resources  Description: INTERVENTIONS:  - Identify barriers to discharge w/patient and caregiver  - Arrange for needed discharge resources and transportation as appropriate  - Identify discharge learning needs (meds, wound care, etc.)  - Arrange for interpretive services to assist at discharge as needed  - Refer to Case Management Department for coordinating discharge planning if the patient needs post-hospital services based on physician/advanced practitioner order or complex needs related to functional status, cognitive ability, or social support system  Outcome: Progressing     Problem: Knowledge Deficit  Goal: Patient/family/caregiver demonstrates understanding of disease process, treatment plan, medications, and discharge instructions  Description: Complete learning assessment and assess knowledge base.   Interventions:  - Provide teaching at level of understanding  - Provide teaching via preferred learning methods  Outcome: Progressing

## 2023-06-28 VITALS
BODY MASS INDEX: 30.1 KG/M2 | HEIGHT: 74 IN | OXYGEN SATURATION: 91 % | HEART RATE: 96 BPM | SYSTOLIC BLOOD PRESSURE: 138 MMHG | RESPIRATION RATE: 17 BRPM | DIASTOLIC BLOOD PRESSURE: 84 MMHG | WEIGHT: 234.57 LBS | TEMPERATURE: 98.6 F

## 2023-06-28 DIAGNOSIS — N32.89 BLADDER SPASMS: Primary | ICD-10-CM

## 2023-06-28 LAB
ERYTHROCYTE [DISTWIDTH] IN BLOOD BY AUTOMATED COUNT: 12.8 % (ref 11.6–15.1)
HCT VFR BLD AUTO: 44.5 % (ref 36.5–49.3)
HGB BLD-MCNC: 15 G/DL (ref 12–17)
MCH RBC QN AUTO: 30.9 PG (ref 26.8–34.3)
MCHC RBC AUTO-ENTMCNC: 33.7 G/DL (ref 31.4–37.4)
MCV RBC AUTO: 92 FL (ref 82–98)
PLATELET # BLD AUTO: 209 THOUSANDS/UL (ref 149–390)
PMV BLD AUTO: 10.1 FL (ref 8.9–12.7)
RBC # BLD AUTO: 4.85 MILLION/UL (ref 3.88–5.62)
WBC # BLD AUTO: 13.84 THOUSAND/UL (ref 4.31–10.16)

## 2023-06-28 PROCEDURE — 55866 LAPS SURG PRST8ECT RPBIC RAD: CPT | Performed by: PHYSICIAN ASSISTANT

## 2023-06-28 PROCEDURE — 99024 POSTOP FOLLOW-UP VISIT: CPT

## 2023-06-28 PROCEDURE — NC001 PR NO CHARGE

## 2023-06-28 PROCEDURE — 85027 COMPLETE CBC AUTOMATED: CPT

## 2023-06-28 RX ORDER — CEPHALEXIN 500 MG/1
500 CAPSULE ORAL EVERY 8 HOURS SCHEDULED
Qty: 3 CAPSULE | Refills: 0 | Status: SHIPPED | OUTPATIENT
Start: 2023-06-28 | End: 2023-06-29

## 2023-06-28 RX ORDER — OXYCODONE HYDROCHLORIDE 10 MG/1
5 TABLET ORAL EVERY 4 HOURS PRN
Qty: 10 TABLET | Refills: 0 | Status: SHIPPED | OUTPATIENT
Start: 2023-06-28 | End: 2023-07-08

## 2023-06-28 RX ORDER — OXYBUTYNIN CHLORIDE 5 MG/1
5 TABLET ORAL 3 TIMES DAILY PRN
Qty: 30 TABLET | Refills: 0 | Status: SHIPPED | OUTPATIENT
Start: 2023-06-28

## 2023-06-28 RX ORDER — POLYETHYLENE GLYCOL 3350 17 G/17G
17 POWDER, FOR SOLUTION ORAL DAILY
Qty: 238 G | Refills: 0 | Status: SHIPPED | OUTPATIENT
Start: 2023-06-28 | End: 2023-07-12

## 2023-06-28 RX ORDER — SENNOSIDES 8.6 MG
8.6 TABLET ORAL
Qty: 7 TABLET | Refills: 0 | Status: SHIPPED | OUTPATIENT
Start: 2023-06-28 | End: 2023-07-05

## 2023-06-28 RX ADMIN — OXYCODONE HYDROCHLORIDE 10 MG: 10 TABLET ORAL at 14:57

## 2023-06-28 RX ADMIN — DOCUSATE SODIUM 100 MG: 100 CAPSULE, LIQUID FILLED ORAL at 08:52

## 2023-06-28 RX ADMIN — ACETAMINOPHEN 650 MG: 325 TABLET, FILM COATED ORAL at 14:57

## 2023-06-28 RX ADMIN — ACETAMINOPHEN 650 MG: 325 TABLET, FILM COATED ORAL at 05:42

## 2023-06-28 RX ADMIN — OXYBUTYNIN CHLORIDE 5 MG: 5 TABLET ORAL at 08:53

## 2023-06-28 RX ADMIN — PHENAZOPYRIDINE 200 MG: 100 TABLET ORAL at 08:52

## 2023-06-28 RX ADMIN — HYDROMORPHONE HYDROCHLORIDE 0.5 MG: 1 INJECTION, SOLUTION INTRAMUSCULAR; INTRAVENOUS; SUBCUTANEOUS at 00:19

## 2023-06-28 RX ADMIN — ALLOPURINOL 100 MG: 100 TABLET ORAL at 08:52

## 2023-06-28 RX ADMIN — HEPARIN SODIUM 5000 UNITS: 5000 INJECTION INTRAVENOUS; SUBCUTANEOUS at 05:41

## 2023-06-28 RX ADMIN — SENNOSIDES 8.6 MG: 8.6 TABLET, FILM COATED ORAL at 08:53

## 2023-06-28 RX ADMIN — PHENAZOPYRIDINE 200 MG: 100 TABLET ORAL at 14:57

## 2023-06-28 RX ADMIN — OXYBUTYNIN CHLORIDE 5 MG: 5 TABLET ORAL at 14:58

## 2023-06-28 RX ADMIN — OXYCODONE HYDROCHLORIDE 5 MG: 5 TABLET ORAL at 08:52

## 2023-06-28 NOTE — PROGRESS NOTES
Progress Note - Urology  Vandana Hammond Apgar 1960, 66 y.o. male MRN: [id number]    Unit/Bed#: S -John Encounter: [id number]      Assessment & Plan:  1. Prostate cancer  -POD 2 robotic laparoscopic radical prostatectomy with Dr. Kj Patel  -Vital signs stable  -Leukocytosis downtrending  -60 mL DEO output since yesterday afternoon. DEO drain removed  -24-hour urine output 2450 mL  -Abdomen distended, soft. Bowel sounds present. Moderate amount of abdominal tenderness. Surgical incisions clean, dry.  -Tolerating normal diet.  -Patient cleared for discharge today    Subjective:   HPI: Patient seen at bedside. Reports improvement in pain since yesterday. Tolerating normal diet, no nausea or vomiting. Review of Systems   Constitutional: Negative for chills and fever. Respiratory: Negative for cough and shortness of breath. Cardiovascular: Negative for chest pain and palpitations. Gastrointestinal: Positive for abdominal pain. Negative for vomiting. Genitourinary: Negative for dysuria and hematuria. Musculoskeletal: Negative for arthralgias and back pain. Skin: Negative for color change and rash. Neurological: Negative for seizures and syncope. All other systems reviewed and are negative. Objective:    Vitals: Blood pressure 124/74, pulse 95, temperature 97.9 °F (36.6 °C), resp. rate 16, height 6' 2" (1.88 m), weight 106 kg (234 lb 9.1 oz), SpO2 92 %. ,Body mass index is 30.12 kg/m². Physical Exam  Constitutional:       General: He is not in acute distress. Appearance: Normal appearance. He is normal weight. He is not ill-appearing or toxic-appearing. HENT:      Head: Normocephalic and atraumatic. Right Ear: External ear normal.      Left Ear: External ear normal.      Nose: Nose normal.      Mouth/Throat:      Mouth: Mucous membranes are moist.   Eyes:      General: No scleral icterus.      Conjunctiva/sclera: Conjunctivae normal.   Cardiovascular:      Rate and Rhythm: Normal rate and regular rhythm. Pulses: Normal pulses. Heart sounds: Normal heart sounds. No murmur heard. No friction rub. No gallop. Pulmonary:      Effort: Pulmonary effort is normal. No respiratory distress. Breath sounds: Normal breath sounds. No stridor. No wheezing, rhonchi or rales. Abdominal:      Comments: Abdomen mild distention, soft. Moderate amount of abdominal tenderness with palpation. No guarding. Surgical incisions clean, dry. DEO drain removed   Genitourinary:     Comments: Herndon catheter draining Pyridium tinged urine  Musculoskeletal:         General: Normal range of motion. Cervical back: Normal range of motion. Skin:     General: Skin is warm. Findings: No rash. Neurological:      General: No focal deficit present. Mental Status: He is alert and oriented to person, place, and time. Mental status is at baseline. Psychiatric:         Mood and Affect: Mood normal.         Behavior: Behavior normal.         Thought Content:  Thought content normal.         Judgment: Judgment normal.             Labs:  Recent Labs     06/26/23  1638 06/27/23  0528 06/28/23  0449   WBC 16.80* 15.64* 13.84*     Recent Labs     06/26/23  1638 06/27/23  0528 06/28/23  0449   HGB 15.2 15.0 15.0     Recent Labs     06/26/23  1638 06/27/23  0528 06/28/23  0449   HCT 45.1 44.0 44.5     Recent Labs     06/26/23  1638 06/27/23  0528   CREATININE 1.12 1.07       History:  Past Medical History:   Diagnosis Date   • Asthma     in younger years   • Atrial fibrillation ([address])    • Basal cell carcinoma 04/2023    left cheek   • BPH (benign prostatic hypertrophy) 09/2020   • CPAP (continuous positive airway pressure) dependence    • H/O cardiac radiofrequency ablation    • Hard to intubate 2014    given card but lost it   • History of transfusion    • Sleep apnea      Social History     Socioeconomic History   • Marital status: /Civil Union     Spouse name: None   • Number of children: None   • Years of education: None   • Highest education level: None   Occupational History   • None   Tobacco Use   • Smoking status: Never     Passive exposure: Past   • Smokeless tobacco: Never   Vaping Use   • Vaping Use: Never used   Substance and Sexual Activity   • Alcohol use:  Yes     Alcohol/week: 1.0 standard drink of alcohol     Types: 1 Cans of beer per week     Comment: rare   • Drug use: Never   • Sexual activity: Yes     Partners: Female     Birth control/protection: Inserts, Other   Other Topics Concern   • None   Social History Narrative   • None     Social Determinants of Health     Financial Resource Strain: Not on file   Food Insecurity: Not on file   Transportation Needs: Not on file   Physical Activity: Not on file   Stress: Not on file   Social Connections: Not on file   Intimate Partner Violence: Not on file   Housing Stability: Not on file     Past Surgical History:   Procedure Laterality Date   • APPENDECTOMY     • CARDIAC ELECTROPHYSIOLOGY STUDY AND ABLATION     • CARDIAC SURGERY  1968    Patent Ductus   • COLONOSCOPY     • MOHS SURGERY Left 04/12/2023    [facility] left [facility] [place]   • [place] BX PROSTATE STRTCTC SATURATION SAMPLING IMG GID N/A 05/09/2023    Procedure: TRANSPERINEAL MRI FUSION  BIOPSY PROSTATE;  Surgeon: Franck Gaines MD;  Location: [facility] MAIN OR;  Service: Urology   • UT LAPS SURG [id number] RPBIC RAD W/NRV SPARING ROBOT N/A 6/26/2023    Procedure: PROSTATECTOMY RADICAL LAPAROSCOPIC W/ ROBOTICS;  Surgeon: Kayli Duron MD;  Location[facility] OR;  Service: Urology   • TONSILLECTOMY       Family History   Problem Relation Age of Onset   • Cancer Mother    • Cancer Father    • Diabetes Maternal Grandmother    • Cancer Maternal Grandmother    • Diabetes Paternal Grandmother    • Cancer Paternal Grandmother    • Cancer Paternal Grandfather        Caitlin Sherwood Asa  Date: 6/28/2023 Time: 10:25 AM

## 2023-06-28 NOTE — DISCHARGE SUMMARY
Discharge Summary - Marylu Hind Apgar 66 y.o. male MRN: [id number]    Unit/Bed#: S -61 Encounter: [id number]    Admission Date: 2023     Discharge Date: 23    HPI: Vernon Servant D Apgar is a 66 y.o. male who presented to the urology clinic for elevated PSA. Patient underwent multiparametric MRI for further evaluation. Patient underwent transperineal MRI fusion biopsy. Patient met with Dr. Kirk Seymour to discuss interim immediate risk prostate cancer. Patient discussed active surveillance, surgical excision, versus radiation therapies. After discussing risk versus benefits patient elected for robotic assisted laparoscopic radical prostatectomy with Dr. Kirk Seymour. Patient presented on 2023 for candida with Dr. Kirk Seymour     Procedure(s):  Gilda Madrigal ROBOTICS  Surgeon(s):  Nasir Dailey MD  [place], [place]  2023    Hospital Course: Patient presented on 2023 for robotic laparoscopic radical prostatectomy with Dr. Kirk Seymour. The operation went as planned. There were no complications. See op note for more details. Postop day 1 patient was progressing well. Vital signs stable. Mild leukocytosis. Patient ambulating without difficulty. Adequate urine output. Minimal DEO output. Abdomen mild distention, firm, bowel sounds present. Incisions clean, dry. Patient tolerating normal diet, no nausea or vomiting. Patient required additional night stay for pain control. Postop day 2 patient was progressing well. Reports improvement in his abdominal pain. Abdomen mild distention, soft. Ambulating without difficulty. Patient clear on postop day 2 for discharge home. Discharge Diagnosis: Prostate cancer ([address])    Condition at Discharge: good    Discharge Medications:  See after visit summary for reconciled discharge medications provided to patient and family.   Patient was discharged home on home medications with the addition of Tylenol, Voltaren, oxycodone, MiraLAX, senna, Keflex. Discharge instructions/Information to patient and family:   See after visit summary for written and verbal information which has been provided to patient and family. Provisions for Follow-Up Care:  See after visit summary for information related to follow-up care and any pertinent home health orders. Disposition: Home    Planned Readmission: No    Discharge Statement   I spent 20 minutes discharging the patient. This time was spent on the day of discharge. I had direct contact with the patient on the day of discharge. Additional documentation is required if more than 30 minutes were spent on discharge.      Signature:   Shirley Leyva PA-C  Date: 6/28/2023 Time: 11:35 AM

## 2023-06-29 NOTE — TELEPHONE ENCOUNTER
Called and left VM for patient  Office number left in message to review post-op questions/appointments

## 2023-06-30 LAB
ABO GROUP BLD BPU: NORMAL
ABO GROUP BLD BPU: NORMAL
BPU ID: NORMAL
BPU ID: NORMAL
CROSSMATCH: NORMAL
CROSSMATCH: NORMAL
UNIT DISPENSE STATUS: NORMAL
UNIT DISPENSE STATUS: NORMAL
UNIT PRODUCT CODE: NORMAL
UNIT PRODUCT CODE: NORMAL
UNIT PRODUCT VOLUME: 275 ML
UNIT PRODUCT VOLUME: 350 ML
UNIT RH: NORMAL
UNIT RH: NORMAL

## 2023-06-30 PROCEDURE — 88309 TISSUE EXAM BY PATHOLOGIST: CPT | Performed by: PATHOLOGY

## 2023-06-30 PROCEDURE — 88344 IMHCHEM/IMCYTCHM EA MLT ANTB: CPT | Performed by: PATHOLOGY

## 2023-06-30 PROCEDURE — 88305 TISSUE EXAM BY PATHOLOGIST: CPT | Performed by: PATHOLOGY

## 2023-06-30 PROCEDURE — 88341 IMHCHEM/IMCYTCHM EA ADD ANTB: CPT | Performed by: PATHOLOGY

## 2023-06-30 PROCEDURE — 88342 IMHCHEM/IMCYTCHM 1ST ANTB: CPT | Performed by: PATHOLOGY

## 2023-07-08 ENCOUNTER — NURSE TRIAGE (OUTPATIENT)
Dept: OTHER | Facility: OTHER | Age: 63
End: 2023-07-08

## 2023-07-08 NOTE — TELEPHONE ENCOUNTER
Reason for Disposition  • [1] Caller has NON-URGENT question AND [2] triager unable to answer question    Answer Assessment - Initial Assessment Questions  1. SYMPTOM: "What's the main symptom you're concerned about?" (e.g., pain, fever, vomiting)      Cloudy urine     2. ONSET: "When did symptoms start?"  Since surgery    3. SURGERY: "What surgery was performed?"     PROSTATECTOMY RADICAL LAPAROSCOPIC W/ ROBOTICS     4. DATE of SURGERY: "When was surgery performed?"   6/26/23    5. ANESTHESIA: " What type of anesthesia did you have?" (e.g., general, spinal, epidural, local)      General    6. PAIN: "Is there any pain?" If Yes, ask: "How bad is it?"  (Scale 1-10; or mild, moderate, severe)  Denies, just discomfort    7. FEVER: "Do you have a fever?" If Yes, ask: "What is your temperature, how was it measured, and when did it start?"  No    8. VOMITING: "Is there any vomiting?" If yes, ask: "How many times?"  No    9. BLEEDING: "Is there any bleeding?" If Yes, ask: "How much?" and "Where?"   no    10. OTHER SYMPTOMS: "Do you have any other symptoms?" (e.g., drainage from wound, painful urination, constipation)        Bladder spasms    Protocols used: POST-OP SYMPTOMS AND QUESTIONS-ADULT-AJIT    Paged on call provider. Provider stated symptoms are normal after procedure. Monitor for fever. Informed patient and he verbalized understanding.

## 2023-07-08 NOTE — TELEPHONE ENCOUNTER
Regarding: Post procedure with possible UTI cloudy urine  ----- Message from Cyril Leggett sent at 7/8/2023 11:32 AM EDT -----  I had a procedure PROSTATECTOMY RADICAL LAPAROSCOPIC W/ ROBOTICS (Abdomen) on 6/26/23 and I may have a UTI with some cloudy urine. I am starting to start with some bladder spasm not sure if its from the catheter or not.

## 2023-07-10 ENCOUNTER — PROCEDURE VISIT (OUTPATIENT)
Dept: UROLOGY | Facility: CLINIC | Age: 63
End: 2023-07-10
Payer: COMMERCIAL

## 2023-07-10 VITALS
BODY MASS INDEX: 30.08 KG/M2 | HEIGHT: 74 IN | OXYGEN SATURATION: 97 % | HEART RATE: 157 BPM | WEIGHT: 234.4 LBS | DIASTOLIC BLOOD PRESSURE: 90 MMHG | SYSTOLIC BLOOD PRESSURE: 118 MMHG

## 2023-07-10 DIAGNOSIS — R35.0 BENIGN PROSTATIC HYPERPLASIA WITH URINARY FREQUENCY: Primary | ICD-10-CM

## 2023-07-10 DIAGNOSIS — N40.1 BENIGN PROSTATIC HYPERPLASIA WITH URINARY FREQUENCY: Primary | ICD-10-CM

## 2023-07-10 LAB — POST-VOID RESIDUAL VOLUME, ML POC: 0 ML

## 2023-07-10 PROCEDURE — 99024 POSTOP FOLLOW-UP VISIT: CPT

## 2023-07-10 PROCEDURE — 51798 US URINE CAPACITY MEASURE: CPT

## 2023-07-10 RX ORDER — POLYETHYLENE GLYCOL 3350 17 G/17G
POWDER, FOR SOLUTION ORAL
COMMUNITY
Start: 2023-06-28

## 2023-07-10 NOTE — PROGRESS NOTES
7/10/2023    Ahmed Creamer Apgar  1960  [id number]    Diagnosis  Chief Complaint    Benign prostatic hyperplasia with urinary frequency         Patient presents for voiding trial managed by Dr. Radha Jimenez to return as scheduled  Patient advised to contact office for any questions or concerns. Procedure Herndon removal/voiding trial    Herndon catheter removed after deflation of an intact balloon. Patient tolerated well. Encouraged patient to hydrate well and return this afternoon for post void residual.  he knows he may return early if uncomfortable and unable to urinate. Patient agrees to this plan. Patient returned this afternoon. Patient states able to void. Patient voided while in office. Bladder ultrasound performed and PVR measured 0ml.     Recent Results (from the past 4 hour(s))   POCT Measure PVR    Collection Time: 07/10/23  2:39 PM   Result Value Ref Range    POST-VOID RESIDUAL VOLUME, ML POC 0 mL           Vitals:    07/10/23 0820   BP: 118/90   Pulse: (!) 157   SpO2: 97%   Weight: 106 kg (234 lb 6.4 oz)   Height: 6' 2" (1.88 m)           Dia Barkley RN

## 2023-07-17 ENCOUNTER — OFFICE VISIT (OUTPATIENT)
Dept: UROLOGY | Facility: CLINIC | Age: 63
End: 2023-07-17

## 2023-07-17 VITALS
WEIGHT: 234 LBS | BODY MASS INDEX: 30.03 KG/M2 | SYSTOLIC BLOOD PRESSURE: 112 MMHG | HEIGHT: 74 IN | DIASTOLIC BLOOD PRESSURE: 78 MMHG

## 2023-07-17 DIAGNOSIS — C61 PROSTATE CANCER (HCC): Primary | ICD-10-CM

## 2023-07-17 DIAGNOSIS — N32.89 BLADDER SPASMS: ICD-10-CM

## 2023-07-17 PROCEDURE — 99024 POSTOP FOLLOW-UP VISIT: CPT | Performed by: UROLOGY

## 2023-07-17 RX ORDER — SILDENAFIL CITRATE 20 MG/1
60 TABLET ORAL DAILY
Qty: 270 TABLET | Refills: 3 | Status: SHIPPED | OUTPATIENT
Start: 2023-07-17 | End: 2024-07-11

## 2023-07-17 RX ORDER — OXYBUTYNIN CHLORIDE 5 MG/1
5 TABLET ORAL
Qty: 30 TABLET | Refills: 3 | Status: SHIPPED | OUTPATIENT
Start: 2023-07-17 | End: 2023-11-14

## 2023-07-17 NOTE — PROGRESS NOTES
Problem List Items Addressed This Visit        Genitourinary    Prostate cancer ([address]) - Primary    Relevant Medications    sildenafil (REVATIO) 20 mg tablet    Other Relevant Orders    PSA Total, Diagnostic   Other Visit Diagnoses     Bladder spasms        Relevant Medications    oxybutynin (DITROPAN) 5 mg tablet                Discussion:      Some nocturnal bladder spasms, I have given him antimuscarinic therapy for this. PSA ordered for 3 months from now. Sildenafil started for penile rehabilitation, incisions are healing well, ECOG 0, some SANTOSH as expected, improving over time. Portions of the above record have been created with voice recognition software. Occasional wrong word or "sound alike" substitution may have occurred due to the inherent limitations of voice recognition software. Read the chart carefully and recognize, using context, where substitution may have occurred. Assessment and plan:       Please see problem oriented charting for the assessment plan of today's urological complaints      Merly Garzon MD      Chief Complaint     As listed above      History of Present Illness     Ancil Punter Apgar is a 66 y.o. man with prostate cancer now s/p RALP on 23. ECOG is 0    Pathology shows Kinjal 3+4=7 prostate cancer, pT2, right anterior margin is positive at area of capsular incision. Postoperative PSA ordered for 3 months from now    Penile rehabilitation discusses with Renita Reveles today, sildenafil sent to the compounding pharmacy. The following portions of the patient's history were reviewed and updated as appropriate: allergies, current medications, past family history, past medical history, past social history, past surgical history and problem list.    Detailed Urologic History     - please refer to HPI    Review of Systems     Review of Systems   Constitutional: Negative. HENT: Negative. Eyes: Negative. Respiratory: Negative. Cardiovascular: Negative. Gastrointestinal: Negative. Endocrine: Negative. Genitourinary:        As per HPI   Musculoskeletal: Negative. Skin: Negative. Allergic/Immunologic: Negative. Neurological: Negative. Hematological: Negative. Psychiatric/Behavioral: Negative. AUA SYMPTOM SCORE    Flowsheet Row Most Recent Value   AUA SYMPTOM SCORE    How often have you had a sensation of not emptying your bladder completely after you finished urinating? 1 (P)     How often have you had to urinate again less than two hours after you finished urinating? 1 (P)     How often have you found you stopped and started again several times when you urinate? 0 (P)     How often have you found it difficult to postpone urination? 1 (P)     How often have you had a weak urinary stream? 0 (P)     How often have you had to push or strain to begin urination? 1 (P)     How many times did you most typically get up to urinate from the time you went to bed at night until the time you got up in the morning? 1 (P)     Quality of Life: If you were to spend the rest of your life with your urinary condition just the way it is now, how would you feel about that? 4 (P)     AUA SYMPTOM SCORE 5 (P)             Allergies     No Known Allergies    Physical Exam     Physical Exam  Vitals reviewed. Constitutional:       General: He is not in acute distress. Appearance: Normal appearance. He is not ill-appearing, toxic-appearing or diaphoretic. HENT:      Head: Normocephalic and atraumatic. Eyes:      General: No scleral icterus. Right eye: No discharge. Left eye: No discharge. Cardiovascular:      Pulses: Normal pulses. Pulmonary:      Effort: Pulmonary effort is normal.   Abdominal:      General: There is no distension. Musculoskeletal:         General: No swelling. Skin:     Coloration: Skin is not jaundiced. Neurological:      General: No focal deficit present. Mental Status: He is alert. Cranial Nerves:  No cranial nerve deficit. Psychiatric:         Mood and Affect: Mood normal.         Behavior: Behavior normal.         Thought Content:  Thought content normal.         Judgment: Judgment normal.             Vital Signs  Vitals:    07/17/23 1127   BP: 112/78   BP Location: Left arm   Patient Position: Sitting   Cuff Size: Adult   Weight: 106 kg (234 lb)   Height: 6' 2" (1.88 m)         Current Medications       Current Outpatient Medications:   •  allopurinol (ZYLOPRIM) 100 mg tablet, Take 100 mg by mouth daily, Disp: , Rfl:   •  Multiple Vitamin (MULTIVITAMIN) capsule, Take by mouth daily, Disp: , Rfl:   •  oxybutynin (DITROPAN) 5 mg tablet, Take 1 tablet (5 mg total) by mouth daily at bedtime, Disp: 30 tablet, Rfl: 3  •  polyethylene glycol (GLYCOLAX) 17 GM/SCOOP powder, MIX 17 G IN A LIQUID AND DRINK DAILY FOR 14 DAYS, Disp: , Rfl:   •  sildenafil (REVATIO) 20 mg tablet, Take 3 tablets (60 mg total) by mouth in the morning, Disp: 270 tablet, Rfl: 3  •  polyethylene glycol (MIRALAX) 17 g packet, Take 17 g by mouth daily for 14 days, Disp: 238 g, Rfl: 0      Active Problems     Patient Active Problem List   Diagnosis   • Irritable bowel syndrome with diarrhea   • Sleep apnea   • CPAP (continuous positive airway pressure) dependence   • H/O cardiac radiofrequency ablation   • Atrial fibrillation (HCC)   • Elevated PSA   • Prostate cancer ([address])   • Encounter to discuss test results         Past Medical History     Past Medical History:   Diagnosis Date   • Asthma     in younger years   • Atrial fibrillation ([address])    • Basal cell carcinoma 04/2023    left cheek   • BPH (benign prostatic hypertrophy) 09/2020   • CPAP (continuous positive airway pressure) dependence    • H/O cardiac radiofrequency ablation    • Hard to intubate 2014    given card but lost it   • History of transfusion    • Sleep apnea          Surgical History     Past Surgical History:   Procedure Laterality Date   • APPENDECTOMY     • CARDIAC ELECTROPHYSIOLOGY STUDY AND ABLATION     • CARDIAC SURGERY  1968    Patent Ductus   • COLONOSCOPY     • MOHS SURGERY Left 04/12/2023    BCC left cheek-Dr Rebolledo   • OH BX PROSTATE STRTCTC SATURATION SAMPLING IMG GID N/A 05/09/2023    Procedure: TRANSPERINEAL MRI FUSION  BIOPSY PROSTATE;  Surgeon: Brielle Ocasio MD;  Location: [facility];  Service: Urology   • OH LAPS SURG [id number] RPBIC RAD W/NRV SPARING ROBOT N/A 6/26/2023    Procedure: PROSTATECTOMY RADICAL LAPAROSCOPIC W/ ROBOTICS;  Surgeon: Teagan Corado MD;  [facility];  Service: Urology   • TONSILLECTOMY           Family History     Family History   Problem Relation Age of Onset   • Cancer Mother    • Cancer Father    • Diabetes Maternal Grandmother    • Cancer Maternal Grandmother    • Diabetes Paternal Grandmother    • Cancer Paternal Grandmother    • Cancer Paternal Grandfather          Social History     Social History     Social History     Tobacco Use   Smoking Status Never   • Passive exposure: Past   Smokeless Tobacco Never         Pertinent Lab Values     Lab Results   Component Value Date    CREATININE 1.07 06/27/2023       Lab Results   Component Value Date    PSA 5.8 (H) 02/03/2023    PSA 3.8 04/13/2021

## 2023-08-11 DIAGNOSIS — N32.89 BLADDER SPASMS: ICD-10-CM

## 2023-08-11 RX ORDER — OXYBUTYNIN CHLORIDE 5 MG/1
5 TABLET ORAL
Qty: 90 TABLET | Refills: 2 | Status: SHIPPED | OUTPATIENT
Start: 2023-08-11

## 2023-10-09 ENCOUNTER — APPOINTMENT (OUTPATIENT)
Dept: LAB | Facility: CLINIC | Age: 63
End: 2023-10-09
Payer: COMMERCIAL

## 2023-10-09 DIAGNOSIS — C61 PROSTATE CANCER (HCC): ICD-10-CM

## 2023-10-09 LAB — PSA SERPL-MCNC: <0.01 NG/ML (ref 0–4)

## 2023-10-09 PROCEDURE — 36415 COLL VENOUS BLD VENIPUNCTURE: CPT

## 2023-10-09 PROCEDURE — 84153 ASSAY OF PSA TOTAL: CPT

## 2023-10-19 ENCOUNTER — TELEPHONE (OUTPATIENT)
Age: 63
End: 2023-10-19

## 2023-10-19 ENCOUNTER — OFFICE VISIT (OUTPATIENT)
Dept: UROLOGY | Facility: CLINIC | Age: 63
End: 2023-10-19
Payer: COMMERCIAL

## 2023-10-19 VITALS
HEIGHT: 74 IN | OXYGEN SATURATION: 96 % | DIASTOLIC BLOOD PRESSURE: 82 MMHG | WEIGHT: 252 LBS | RESPIRATION RATE: 18 BRPM | SYSTOLIC BLOOD PRESSURE: 144 MMHG | HEART RATE: 68 BPM | BODY MASS INDEX: 32.34 KG/M2

## 2023-10-19 DIAGNOSIS — C61 PROSTATE CANCER (HCC): Primary | ICD-10-CM

## 2023-10-19 PROCEDURE — 99213 OFFICE O/P EST LOW 20 MIN: CPT | Performed by: PHYSICIAN ASSISTANT

## 2023-10-19 NOTE — TELEPHONE ENCOUNTER
Pt calling to check if there was a script in his chart for his appt in 4 months. No further action needed.

## 2023-11-12 DIAGNOSIS — C61 PROSTATE CANCER (HCC): ICD-10-CM

## 2023-11-13 RX ORDER — SILDENAFIL CITRATE 20 MG/1
60 TABLET ORAL DAILY
Qty: 90 TABLET | Refills: 3 | Status: SHIPPED | OUTPATIENT
Start: 2023-11-13 | End: 2024-11-07

## 2023-12-04 ENCOUNTER — OFFICE VISIT (OUTPATIENT)
Dept: URGENT CARE | Facility: CLINIC | Age: 63
End: 2023-12-04
Payer: COMMERCIAL

## 2023-12-04 VITALS
RESPIRATION RATE: 18 BRPM | HEART RATE: 80 BPM | DIASTOLIC BLOOD PRESSURE: 78 MMHG | OXYGEN SATURATION: 95 % | SYSTOLIC BLOOD PRESSURE: 137 MMHG | TEMPERATURE: 97.5 F

## 2023-12-04 DIAGNOSIS — R05.1 ACUTE COUGH: Primary | ICD-10-CM

## 2023-12-04 DIAGNOSIS — J06.9 VIRAL URI: ICD-10-CM

## 2023-12-04 PROCEDURE — 99213 OFFICE O/P EST LOW 20 MIN: CPT | Performed by: ORTHOPAEDIC SURGERY

## 2023-12-04 RX ORDER — BENZONATATE 200 MG/1
200 CAPSULE ORAL 3 TIMES DAILY PRN
Qty: 20 CAPSULE | Refills: 0 | Status: SHIPPED | OUTPATIENT
Start: 2023-12-04

## 2023-12-04 NOTE — PROGRESS NOTES
[facility]        NAME: Eileen Lorenzo Apgar is a 66 y.o. male  : 1960    MRN: [id number]  DATE: 2023  TIME: 8:23 AM    Assessment and Plan   Acute cough [R05.1]  1. Acute cough  benzonatate (TESSALON) 200 MG capsule      2. Viral URI              Patient Instructions       Most upper respiratory infections are viral and resolve on their own within 10-14 days. Antibiotics are not indicated for the viral infection, and are only prescribed if there is evidence for a bacterial infection. Sometimes an upper respiratory infection may lead to secondary bacterial infection, such as bacterial sinusitis, in which case antibiotics would be indicated at that time. If viral symptoms continue beyond 10-14 days or if you experience ongoing fevers, productive cough with green, brown, bloody phlegm production, you may have developed a bacterial infection. For the uncomplicated viral upper respiratory infection conservative management includes:    Ibuprofen/Motrin 600mg every 6 hours for fever, headaches, body aches   Ibuprofen is an NSAID. Please stop medication if you experience stomach/abdominal pain and report to your primary care provider. Ask your primary care provider before you take NSAIDs if you are on any blood thinners, or if you have a history of heart disease, kidney disease, gastric bypass surgery, GI bleed, or poorly controlled high blood pressure. May use acetaminophen/Tylenol as directed on the bottle between doses of ibuprofen. Do not exceed 4,000mg of Tylenol a day. Cough:  Guaifenesin/Mucinex as directed on the bottle for congestion and mucous-y cough.    Dextromethorphan/Delsym for dry cough and cough suppression   OTC combination cough and congestion medication such as Dimetapp  If prescribed, take Tessalon Pearles or Bromfed as directed  Cepacol lozenges, "throat coat" tea for sore throat  Vitamin/Minerals:  Vitamin D3 2,000 IU daily  Vitamin C 1000mg twice a day  Some studies suggest that Zinc 12.5-15mg every 2 hours while awake for 5 days may shorten symptom duration by 1-2 days  Other:   Plenty of fluids and rest  Sudafed for sinus pressure (Ask PCP first if history of HTN or cardiac disease)  Nasal sinus rinses such as Neti Pot (please only use distilled/sterile water that can be purchased at your local pharmacy)  Follow up with PCP in 3-5 days    Chief Complaint     Chief Complaint   Patient presents with    Cold Like Symptoms     Pt c/o cough, congestion and fever since Friday has taken mucinex w/ no result         History of Present Illness       51-year-old male presents to urgent care for evaluation of cough and congestion since this past Friday. Patient reports yesterday he had a fever of 101, the day he does seem to feel a bit better. He denies any nausea, vomiting, diarrhea. He denies any history of asthma, COPD, or smoking. He has no known sick contacts. For symptom relief the patient has been using Mucinex. Review of Systems   Review of Systems   Constitutional:  Positive for fatigue and fever. Negative for chills. HENT:  Positive for congestion. Negative for ear pain and sore throat. Eyes:  Negative for pain and visual disturbance. Respiratory:  Positive for cough. Negative for shortness of breath. Cardiovascular:  Negative for chest pain and palpitations. Gastrointestinal:  Negative for abdominal pain, diarrhea, nausea and vomiting. Genitourinary:  Negative for dysuria and hematuria. Musculoskeletal:  Negative for arthralgias and back pain. Skin:  Negative for color change and rash. Neurological:  Negative for dizziness, seizures, syncope, weakness, light-headedness and headaches. Psychiatric/Behavioral: Negative. All other systems reviewed and are negative.         Current Medications       Current Outpatient Medications:     benzonatate (TESSALON) 200 MG capsule, Take 1 capsule (200 mg total) by mouth 3 (three) times a day as needed for cough, Disp: 20 capsule, Rfl: 0    Multiple Vitamin (MULTIVITAMIN) capsule, Take by mouth daily, Disp: , Rfl:     allopurinol (ZYLOPRIM) 100 mg tablet, Take 100 mg by mouth daily (Patient not taking: Reported on 12/4/2023), Disp: , Rfl:     polyethylene glycol (GLYCOLAX) 17 GM/SCOOP powder, MIX 17 G IN A LIQUID AND DRINK DAILY FOR 14 DAYS (Patient not taking: Reported on 12/4/2023), Disp: , Rfl:     sildenafil (REVATIO) 20 mg tablet, Take 3 tablets (60 mg total) by mouth in the morning (Patient not taking: Reported on 12/4/2023), Disp: 90 tablet, Rfl: 3    Current Allergies     Allergies as of 12/04/2023    (No Known Allergies)            The following portions of the patient's history were reviewed and updated as appropriate: allergies, current medications, past family history, past medical history, past social history, past surgical history and problem list.     Past Medical History:   Diagnosis Date    Asthma     in younger years    Atrial fibrillation ([address])     Basal cell carcinoma 04/2023    left cheek    BPH (benign prostatic hypertrophy) 09/2020    CPAP (continuous positive airway pressure) dependence     H/O cardiac radiofrequency ablation     Hard to intubate 2014    given card but lost it    History of transfusion     Sleep apnea        Past Surgical History:   Procedure Laterality Date    APPENDECTOMY      CARDIAC ELECTROPHYSIOLOGY STUDY AND ABLATION      CARDIAC SURGERY  1968    Patent Ductus    COLONOSCOPY      MOHS SURGERY Left 04/12/2023    BCC left cheek-Dr Rebolledo    SD BX PROSTATE STRTCTC SATURATION SAMPLING IMG GID N/A 05/09/2023    Procedure: TRANSPERINEAL MRI FUSION  BIOPSY PROSTATE;  Surgeon: Ghislaine Littlejohn MD;  [facility];  Service: Urology    SD LAPS SURG [id number] RPBIC RAD W/NRV SPARING ROBOT N/A 6/26/2023    Procedure: PROSTATECTOMY RADICAL LAPAROSCOPIC W/ ROBOTICS;  Surgeon: Felicia Salas MD;  [facility];  Service: Urology    TONSILLECTOMY         Family History   Problem Relation Age of Onset    Cancer Mother     Cancer Father     Diabetes Maternal Grandmother     Cancer Maternal Grandmother     Diabetes Paternal Grandmother     Cancer Paternal Grandmother     Cancer Paternal Grandfather          Medications have been verified. Objective   /78   Pulse 80   Temp 97.5 °F (36.4 °C) (Tympanic)   Resp 18   SpO2 95%        Physical Exam     Physical Exam  Vitals and nursing note reviewed. Constitutional:       General: He is not in acute distress. Appearance: Normal appearance. He is not ill-appearing. HENT:      Head: Normocephalic and atraumatic. Right Ear: Tympanic membrane normal.      Left Ear: Tympanic membrane normal.      Nose: Nose normal.      Mouth/Throat:      Pharynx: Oropharynx is clear. Eyes:      Extraocular Movements: Extraocular movements intact. Pupils: Pupils are equal, round, and reactive to light. Cardiovascular:      Rate and Rhythm: Normal rate and regular rhythm. Pulses: Normal pulses. Heart sounds: Normal heart sounds. Pulmonary:      Effort: Pulmonary effort is normal. No respiratory distress. Breath sounds: Normal breath sounds. No wheezing or rhonchi. Musculoskeletal:         General: Normal range of motion. Cervical back: Normal range of motion. Skin:     General: Skin is warm and dry. Capillary Refill: Capillary refill takes less than 2 seconds. Neurological:      General: No focal deficit present. Mental Status: He is alert and oriented to person, place, and time.    Psychiatric:         Mood and Affect: Mood normal.         Behavior: Behavior normal.

## 2024-01-23 ENCOUNTER — APPOINTMENT (OUTPATIENT)
Dept: LAB | Facility: CLINIC | Age: 64
End: 2024-01-23
Payer: COMMERCIAL

## 2024-01-23 DIAGNOSIS — C61 PROSTATE CANCER (HCC): ICD-10-CM

## 2024-01-23 LAB — PSA SERPL-MCNC: <0.01 NG/ML (ref 0–4)

## 2024-01-23 PROCEDURE — 36415 COLL VENOUS BLD VENIPUNCTURE: CPT

## 2024-01-23 PROCEDURE — 84153 ASSAY OF PSA TOTAL: CPT

## 2024-02-09 RX ORDER — AMOXICILLIN 875 MG/1
875 TABLET, COATED ORAL 2 TIMES DAILY
COMMUNITY
Start: 2023-12-06

## 2024-02-12 ENCOUNTER — TELEPHONE (OUTPATIENT)
Age: 64
End: 2024-02-12

## 2024-02-12 NOTE — TELEPHONE ENCOUNTER
Pt called to reschedule 02/13/24 due to impending weather I expressed to him schedules are booking out 12 weeks and to keep scheduled appointment if weather is bad tomorrow to call then which he was in agreement.

## 2024-02-13 ENCOUNTER — TELEMEDICINE (OUTPATIENT)
Dept: UROLOGY | Facility: CLINIC | Age: 64
End: 2024-02-13
Payer: COMMERCIAL

## 2024-02-13 DIAGNOSIS — C61 PROSTATE CANCER (HCC): Primary | ICD-10-CM

## 2024-02-13 PROCEDURE — 99213 OFFICE O/P EST LOW 20 MIN: CPT | Performed by: PHYSICIAN ASSISTANT

## 2024-02-13 NOTE — PROGRESS NOTES
UROLOGY PROGRESS NOTE   Patient Identifiers: James Apgar (MRN [id number])  Date of Service: 2024    Subjective:   63-year-old man history of Kinjal 7 stage II prostate cancer.  He had a robotic prostatectomy in 2023.  He has good urinary control.  PSA is<0.01.  He has been using 20 mg sildenafil without much success so far.  He otherwise has no complaints.    Reason for visit: Prostate cancer follow-up    Objective:     VITALS:    Vitals:    10/19/23 0957   BP: 144/82   Pulse: 68   Resp: 18   SpO2: 96%     AUA SYMPTOM SCORE      Flowsheet Row Most Recent Value   AUA SYMPTOM SCORE    How often have you had a sensation of not emptying your bladder completely after you finished urinating? 0 (P)    How often have you had to urinate again less than two hours after you finished urinating? 1 (P)    How often have you found you stopped and started again several times when you urinate? 0 (P)    How often have you found it difficult to postpone urination? 1 (P)    How often have you had a weak urinary stream? 1 (P)    How often have you had to push or strain to begin urination? 0 (P)    How many times did you most typically get up to urinate from the time you went to bed at night until the time you got up in the morning? 5 (P)    Quality of Life: If you were to spend the rest of your life with your urinary condition just the way it is now, how would you feel about that? 3 (P)    AUA SYMPTOM SCORE 8 (P)               LABS:  Lab Results   Component Value Date    HGB 15.0 2023    HCT 44.5 2023    WBC 13.84 (H) 2023     2023   ]    Lab Results   Component Value Date    K 4.1 2023     2023    CO2 24 2023    BUN 16 2023    CREATININE 1.07 2023    CALCIUM 8.4 2023   ]        INPATIENT MEDS:    Current Outpatient Medications:     allopurinol (ZYLOPRIM) 100 mg tablet, Take 100 mg by mouth daily (Patient not taking: Reported on 2023), Disp: ,  "Rfl:     Multiple Vitamin (MULTIVITAMIN) capsule, Take by mouth daily, Disp: , Rfl:     polyethylene glycol (GLYCOLAX) 17 GM/SCOOP powder, MIX 17 G IN A LIQUID AND DRINK DAILY FOR 14 DAYS (Patient not taking: Reported on 12/4/2023), Disp: , Rfl:     amoxicillin (AMOXIL) 875 mg tablet, Take 875 mg by mouth 2 (two) times a day, Disp: , Rfl:     benzonatate (TESSALON) 200 MG capsule, Take 1 capsule (200 mg total) by mouth 3 (three) times a day as needed for cough, Disp: 20 capsule, Rfl: 0    sildenafil (REVATIO) 20 mg tablet, Take 3 tablets (60 mg total) by mouth in the morning (Patient not taking: Reported on 12/4/2023), Disp: 90 tablet, Rfl: 3      Physical Exam:   /82 (BP Location: Left arm, Patient Position: Sitting, Cuff Size: Standard)   Pulse 68   Resp 18   Ht 6' 2\" (1.88 m)   Wt 114 kg (252 lb)   SpO2 96%   BMI 32.35 kg/m²   GEN: no acute distress    RESP: breathing comfortably with no accessory muscle use    ABD: soft, non-tender, non-distended   INCISION:    EXT: no significant peripheral edema     RADIOLOGY:   none     Assessment:   #1.  Prostate cancer  #2.  Erectile dysfunction after robotic prostatectomy    Plan:   -Discussed different modalities for achieving adequate sexual function  -I gave him a brochure for a vacuum pump  -We talked about sildenafil and increasing the dose once he runs out of what he has  -Otherwise follow-up in 4 months with PSA prior to visit          "

## 2024-02-13 NOTE — PROGRESS NOTES
Virtual Brief Visit    This Visit is being completed by telephone. The Patient is located at Home and in the following state in which I hold an active license [place]    The patient was identified by name and date of birth. James D Apgar was informed that this is a telemedicine visit and that the visit is being conducted through the Epic Embedded platform. He agrees to proceed..  My office door was closed. No one else was in the room.  He acknowledged consent and understanding of privacy and security of the video platform. The patient has agreed to participate and understands they can discontinue the visit at any time.    Patient is aware this is a billable service.     HPI: 63-year-old man history of [place] 7 stage II prostate cancer.  He had a robotic prostatectomy in .  He has good urinary control.  PSA is undetectable.<0.01.  He has tried sildenafil as well as a vacuum pump.        Assessment:    #1.  Prostate cancer  #2.  Erectile dysfunction    Plan:    -He will follow-up in 4 months with PSA prior to visit  -When he is due for a refill I will order sildenafil 100 mg in place of his 20 mg dose.    It was my intent to perform this visit via video technology but the patient was not able to do a video connection so the visit was completed via audio telephone only.      Problem List Items Addressed This Visit    None      Recent Visits  No visits were found meeting these conditions.  Showing recent visits within past 7 days and meeting all other requirements  Today's Visits  Date Type Provider Dept   24 Telemedicine Joe Tatum PA-C  [facility]   Showing today's visits and meeting all other requirements  Future Appointments  No visits were found meeting these conditions.  Showing future appointments within next 150 days and meeting all other requirements         Visit Time  Total Visit Duration: 15

## 2024-02-15 ENCOUNTER — TELEPHONE (OUTPATIENT)
Dept: UROLOGY | Facility: CLINIC | Age: 64
End: 2024-02-15

## 2024-02-15 NOTE — TELEPHONE ENCOUNTER
Patient called back and is scheduled for next available with CODY Tatum in [place].    Pt is scheduled for 7/16 at 8:15 AM.

## 2024-02-15 NOTE — TELEPHONE ENCOUNTER
CALLED AND LM FOR PT TO SCHEDULE:    Return in about 4 months (around 6/13/2024) for psa prior to visit.

## 2024-03-20 DIAGNOSIS — Z00.6 ENCOUNTER FOR EXAMINATION FOR NORMAL COMPARISON OR CONTROL IN CLINICAL RESEARCH PROGRAM: ICD-10-CM

## 2024-04-23 DIAGNOSIS — C61 PROSTATE CANCER (HCC): ICD-10-CM

## 2024-04-23 RX ORDER — SILDENAFIL CITRATE 20 MG/1
TABLET ORAL
Qty: 90 TABLET | Refills: 3 | Status: SHIPPED | OUTPATIENT
Start: 2024-04-23

## 2024-06-13 ENCOUNTER — APPOINTMENT (OUTPATIENT)
Dept: LAB | Facility: CLINIC | Age: 64
End: 2024-06-13
Payer: COMMERCIAL

## 2024-06-13 DIAGNOSIS — C61 PROSTATE CANCER (HCC): ICD-10-CM

## 2024-06-13 DIAGNOSIS — Z00.6 ENCOUNTER FOR EXAMINATION FOR NORMAL COMPARISON OR CONTROL IN CLINICAL RESEARCH PROGRAM: ICD-10-CM

## 2024-06-13 LAB — PSA SERPL-MCNC: <0.008 NG/ML (ref 0–4)

## 2024-06-13 PROCEDURE — 36415 COLL VENOUS BLD VENIPUNCTURE: CPT

## 2024-06-13 PROCEDURE — 84153 ASSAY OF PSA TOTAL: CPT

## 2024-06-25 LAB
APOB+LDLR+PCSK9 GENE MUT ANL BLD/T: NOT DETECTED
BRCA1+BRCA2 DEL+DUP + FULL MUT ANL BLD/T: NOT DETECTED
MLH1+MSH2+MSH6+PMS2 GN DEL+DUP+FUL M: NOT DETECTED

## 2024-07-16 ENCOUNTER — OFFICE VISIT (OUTPATIENT)
Dept: UROLOGY | Facility: CLINIC | Age: 64
End: 2024-07-16
Payer: COMMERCIAL

## 2024-07-16 VITALS
HEIGHT: 74 IN | BODY MASS INDEX: 32.47 KG/M2 | WEIGHT: 253 LBS | OXYGEN SATURATION: 96 % | DIASTOLIC BLOOD PRESSURE: 72 MMHG | HEART RATE: 70 BPM | SYSTOLIC BLOOD PRESSURE: 134 MMHG

## 2024-07-16 DIAGNOSIS — C61 PROSTATE CANCER (HCC): Primary | ICD-10-CM

## 2024-07-16 PROCEDURE — 99213 OFFICE O/P EST LOW 20 MIN: CPT | Performed by: PHYSICIAN ASSISTANT

## 2024-07-16 NOTE — PROGRESS NOTES
UROLOGY PROGRESS NOTE   Patient Identifiers: James Apgar (MRN [id number])  Date of Service: 2024    Subjective:   64-year-old man history of [place] 7 stage II prostate cancer.  He had a robotic prostatectomy in 2023.  He has good urinary control.  PSA remains undetectable<0.008.  He has been using sildenafil and a vacuum pump which has not been satisfactory.    Reason for visit: Prostate cancer follow-up    Objective:     VITALS:    There were no vitals filed for this visit.  AUA SYMPTOM SCORE      Flowsheet Row Most Recent Value   AUA SYMPTOM SCORE    How often have you had a sensation of not emptying your bladder completely after you finished urinating? 1 (P)     How often have you had to urinate again less than two hours after you finished urinating? 2 (P)     How often have you found you stopped and started again several times when you urinate? 0 (P)     How often have you found it difficult to postpone urination? 0 (P)     How often have you had a weak urinary stream? 1 (P)     How often have you had to push or strain to begin urination? 0 (P)     How many times did you most typically get up to urinate from the time you went to bed at night until the time you got up in the morning? 2 (P)     Quality of Life: If you were to spend the rest of your life with your urinary condition just the way it is now, how would you feel about that? 2 (P)     AUA SYMPTOM SCORE 6 (P)                LABS:  Lab Results   Component Value Date    HGB 15.0 2023    HCT 44.5 2023    WBC 13.84 (H) 2023     2023   ]    Lab Results   Component Value Date    K 4.1 2023     2023    CO2 24 2023    BUN 16 2023    CREATININE 1.07 2023    CALCIUM 8.4 2023   ]        INPATIENT MEDS:    Current Outpatient Medications:     allopurinol (ZYLOPRIM) 100 mg tablet, Take 100 mg by mouth daily (Patient not taking: Reported on 2023), Disp: , Rfl:     amoxicillin  (AMOXIL) 875 mg tablet, Take 875 mg by mouth 2 (two) times a day, Disp: , Rfl:     benzonatate (TESSALON) 200 MG capsule, Take 1 capsule (200 mg total) by mouth 3 (three) times a day as needed for cough, Disp: 20 capsule, Rfl: 0    Multiple Vitamin (MULTIVITAMIN) capsule, Take by mouth daily, Disp: , Rfl:     polyethylene glycol (GLYCOLAX) 17 GM/SCOOP powder, MIX 17 G IN A LIQUID AND DRINK DAILY FOR 14 DAYS (Patient not taking: Reported on 12/4/2023), Disp: , Rfl:     sildenafil (REVATIO) 20 mg tablet, Take 1-5 tablets as needed for ED, Disp: 90 tablet, Rfl: 3      Physical Exam:   There were no vitals taken for this visit.  GEN: no acute distress    RESP: breathing comfortably with no accessory muscle use    ABD: soft, non-tender, non-distended   INCISION:    EXT: no significant peripheral edema         RADIOLOGY:   none     Assessment:   1.  Prostate cancer  #2.  Erectile dysfunction following radical prostatectomy    Plan:   -Will schedule him for a Trimix trial in the office  -Prescription faxed to [facility]  -  -

## 2024-07-17 ENCOUNTER — APPOINTMENT (OUTPATIENT)
Dept: RADIOLOGY | Facility: CLINIC | Age: 64
End: 2024-07-17
Payer: COMMERCIAL

## 2024-07-17 ENCOUNTER — TELEPHONE (OUTPATIENT)
Dept: UROLOGY | Facility: CLINIC | Age: 64
End: 2024-07-17

## 2024-07-17 ENCOUNTER — OFFICE VISIT (OUTPATIENT)
Dept: URGENT CARE | Facility: CLINIC | Age: 64
End: 2024-07-17
Payer: COMMERCIAL

## 2024-07-17 VITALS
OXYGEN SATURATION: 96 % | DIASTOLIC BLOOD PRESSURE: 83 MMHG | SYSTOLIC BLOOD PRESSURE: 145 MMHG | TEMPERATURE: 96.9 F | HEART RATE: 78 BPM | RESPIRATION RATE: 16 BRPM

## 2024-07-17 DIAGNOSIS — M25.561 PAIN IN JOINT OF RIGHT KNEE: ICD-10-CM

## 2024-07-17 DIAGNOSIS — M10.9 ACUTE GOUT OF RIGHT KNEE, UNSPECIFIED CAUSE: Primary | ICD-10-CM

## 2024-07-17 PROCEDURE — 99214 OFFICE O/P EST MOD 30 MIN: CPT | Performed by: PHYSICIAN ASSISTANT

## 2024-07-17 PROCEDURE — 73560 X-RAY EXAM OF KNEE 1 OR 2: CPT

## 2024-07-17 RX ORDER — PREDNISONE 10 MG/1
TABLET ORAL
Qty: 30 TABLET | Refills: 0 | Status: SHIPPED | OUTPATIENT
Start: 2024-07-17 | End: 2024-07-29

## 2024-07-17 RX ORDER — INDOMETHACIN 50 MG/1
50 CAPSULE ORAL
Qty: 21 CAPSULE | Refills: 0 | Status: SHIPPED | OUTPATIENT
Start: 2024-07-17 | End: 2024-07-24

## 2024-07-17 NOTE — PROGRESS NOTES
[facility]        NAME: James D Apgar is a 64 y.o. male  : 1960    MRN: [id number]  DATE: 2024  TIME: 11:35 AM    Assessment and Plan   Acute gout of right knee, unspecified cause [M10.9]  1. Acute gout of right knee, unspecified cause  indomethacin (INDOCIN) 50 mg capsule    predniSONE 10 mg tablet      2. Pain in joint of right knee  XR knee 1 or 2 vw right            Patient Instructions     Patient Instructions   Xray provider read- no acute findings identified.      Recommended treatment with oral steroid and indomethacin.     Follow up with PCP in 3-5 days.  Proceed to  ER if symptoms worsen.    If tests are performed, our office will contact you with results only if changes need to made to the care plan discussed with you at the visit. [facility]        Chief Complaint     Chief Complaint   Patient presents with    Knee Pain     RIGHT KNEE, STARTED , WASN'T BAD, BUT WORSENED LAST NIGHT. HAS A HISTORY OF GOUT, BUT ALWAYS HAD IN FOOT.         History of Present Illness       Patient presents for evaluation of right knee pain and swelling that started on  and has slowly been getting worse. He states it feels warm to him and he has pain right over the top of his knee. He states that he has had gout in his foot and this feels exactly the same but now in his knee. He states he is on allopurinol for maintenance.         Review of Systems   Review of Systems   Musculoskeletal:  Positive for arthralgias, joint swelling and myalgias.   Skin:  Negative for color change, pallor, rash and wound.   All other systems reviewed and are negative.        Current Medications       Current Outpatient Medications:     allopurinol (ZYLOPRIM) 100 mg tablet, Take 100 mg by mouth daily, Disp: , Rfl:     indomethacin (INDOCIN) 50 mg capsule, Take 1 capsule (50 mg total) by mouth 3 (three) times a day with meals for 7 days, Disp: 21 capsule, Rfl: 0     Multiple Vitamin (MULTIVITAMIN) capsule, Take by mouth daily, Disp: , Rfl:     Papav-Phentolamine-Alprostadil (PGE1 / PHENTOLAMINE / PAPAVERINE, STANDARD TRIMIX, 20 MCG / 1 MG / 30 MG INJECTION), by Intracavernosal route once, Disp: , Rfl:     predniSONE 10 mg tablet, Take 4 tablets (40 mg total) by mouth daily for 3 days, THEN 3 tablets (30 mg total) daily for 3 days, THEN 2 tablets (20 mg total) daily for 3 days, THEN 1 tablet (10 mg total) daily for 3 days., Disp: 30 tablet, Rfl: 0    sildenafil (REVATIO) 20 mg tablet, Take 1-5 tablets as needed for ED, Disp: 90 tablet, Rfl: 3    Current Allergies     Allergies as of 07/17/2024    (No Known Allergies)            The following portions of the patient's history were reviewed and updated as appropriate: allergies, current medications, past family history, past medical history, past social history, past surgical history and problem list.     Past Medical History:   Diagnosis Date    Asthma     in younger years    Atrial fibrillation (HCC)     Basal cell carcinoma 04/2023    left cheek    BPH (benign prostatic hypertrophy) 09/2020    CPAP (continuous positive airway pressure) dependence     H/O cardiac radiofrequency ablation     Hard to intubate 2014    given card but lost it    History of transfusion     Sleep apnea        Past Surgical History:   Procedure Laterality Date    APPENDECTOMY      CARDIAC ELECTROPHYSIOLOGY STUDY AND ABLATION      CARDIAC SURGERY  1968    Patent Ductus    COLONOSCOPY      MOHS SURGERY Left 04/12/2023    BCC left cheek-Dr Rebolledo    ME BX PROSTATE STRTCTC SATURATION SAMPLING IMG GID N/A 05/09/2023    Procedure: TRANSPERINEAL MRI FUSION  BIOPSY PROSTATE;  Surgeon: Gavino Singh MD;  [facility];  Service: Urology    ME LAPS SURG [id number] RPBIC RAD W/NRV SPARING ROBOT N/A 6/26/2023    Procedure: PROSTATECTOMY RADICAL LAPAROSCOPIC W/ ROBOTICS;  Surgeon: Earnest Hernandez MD;  [facility];  Service: Urology     TONSILLECTOMY         Family History   Problem Relation Age of Onset    Cancer Mother     Cancer Father     Diabetes Maternal Grandmother     Cancer Maternal Grandmother     Diabetes Paternal Grandmother     Cancer Paternal Grandmother     Cancer Paternal Grandfather          Medications have been verified.        Objective   /83   Pulse 78   Temp (!) 96.9 °F (36.1 °C)   Resp 16   SpO2 96%        Physical Exam     Physical Exam  Vitals and nursing note reviewed.   Constitutional:       Appearance: Normal appearance.   Musculoskeletal:      Right knee: Swelling, effusion and bony tenderness (Along the patella) present. No erythema, ecchymosis or lacerations. Normal range of motion.      Comments: Right knee warm to the touch.    Skin:     General: Skin is warm and dry.      Capillary Refill: Capillary refill takes less than 2 seconds.   Neurological:      General: No focal deficit present.      Mental Status: He is alert and oriented to person, place, and time.   Psychiatric:         Mood and Affect: Mood normal.         Behavior: Behavior normal.

## 2024-07-17 NOTE — TELEPHONE ENCOUNTER
Radha Raza RN   to [facility]       7/17/24  6:47 AM  Please mail out whatever booklet Serge has for trimix  July 16, 2024  Joe Tatum PA-C   to Radha Raza RN       7/16/24  3:39 PM  Yes I forgot to give him 1.  We can mail it though or he can get 1 if he comes back for Trimix trial  Radha Raza RN   to Joe Tatum PA-C       7/16/24 10:03 AM  Do we have booklets on trimix?

## 2024-07-17 NOTE — PATIENT INSTRUCTIONS
Xray provider read- no acute findings identified.      Recommended treatment with oral steroid and indomethacin.     Follow up with PCP in 3-5 days.  Proceed to  ER if symptoms worsen.    If tests are performed, our office will contact you with results only if changes need to made to the care plan discussed with you at the visit. You can review your full results on [facility].

## 2024-08-05 ENCOUNTER — PROCEDURE VISIT (OUTPATIENT)
Dept: UROLOGY | Facility: CLINIC | Age: 64
End: 2024-08-05
Payer: COMMERCIAL

## 2024-08-05 VITALS
OXYGEN SATURATION: 95 % | HEIGHT: 74 IN | DIASTOLIC BLOOD PRESSURE: 74 MMHG | WEIGHT: 251 LBS | SYSTOLIC BLOOD PRESSURE: 144 MMHG | BODY MASS INDEX: 32.21 KG/M2 | HEART RATE: 100 BPM

## 2024-08-05 DIAGNOSIS — N52.31 ERECTILE DYSFUNCTION AFTER RADICAL PROSTATECTOMY: ICD-10-CM

## 2024-08-05 DIAGNOSIS — C61 PROSTATE CANCER (HCC): Primary | ICD-10-CM

## 2024-08-05 PROCEDURE — 54235 NJX CORPORA CAVERNOSA RX AGT: CPT | Performed by: PHYSICIAN ASSISTANT

## 2024-08-05 NOTE — PROGRESS NOTES
Injection corpora cavernosa     Date/Time  8/5/2024 2:00 PM     Performed by  Joe Tatum PA-C   Authorized by  Joe Tatum PA-C     Universal Protocol   Consent given by: patient  Patient understanding: patient states understanding of the procedure being performed     Procedure Details   Procedure Notes: 64-year-old man history of prostate cancer.  He had a radical prostatectomy in 2023 and has medically refractory erectile dysfunction.  He has failed a vacuum pump and PDE 5 medications.  Here for Trimix trial.  Patient is instructed on drawing up medication injection.  He is instructed on procedures for priapism.  Patient concentration is 2/1/30.  Patient initial dose was 0.2 mL.  He achieved satisfactory erection with that dose.  He was instructed on titration both up and down if necessary.  He already has Sudafed at home.  Will follow-up with me in 5 months with PSA prior to visit for his regular appointment.

## 2024-08-07 ENCOUNTER — OFFICE VISIT (OUTPATIENT)
Dept: URGENT CARE | Facility: CLINIC | Age: 64
End: 2024-08-07
Payer: COMMERCIAL

## 2024-08-07 VITALS
RESPIRATION RATE: 17 BRPM | TEMPERATURE: 96.5 F | HEART RATE: 97 BPM | BODY MASS INDEX: 32.1 KG/M2 | OXYGEN SATURATION: 95 % | WEIGHT: 250 LBS | SYSTOLIC BLOOD PRESSURE: 124 MMHG | DIASTOLIC BLOOD PRESSURE: 78 MMHG

## 2024-08-07 DIAGNOSIS — M10.061 ACUTE IDIOPATHIC GOUT OF RIGHT KNEE: Primary | ICD-10-CM

## 2024-08-07 DIAGNOSIS — M10.9 ACUTE GOUT OF RIGHT KNEE, UNSPECIFIED CAUSE: ICD-10-CM

## 2024-08-07 PROCEDURE — 99213 OFFICE O/P EST LOW 20 MIN: CPT | Performed by: NURSE PRACTITIONER

## 2024-08-07 RX ORDER — INDOMETHACIN 50 MG/1
50 CAPSULE ORAL
Qty: 21 CAPSULE | Refills: 0 | Status: SHIPPED | OUTPATIENT
Start: 2024-08-07 | End: 2024-08-14

## 2024-08-07 RX ORDER — PREDNISONE 10 MG/1
TABLET ORAL
Qty: 24 TABLET | Refills: 0 | Status: SHIPPED | OUTPATIENT
Start: 2024-08-07 | End: 2024-08-16

## 2024-08-07 NOTE — PROGRESS NOTES
[facility]        NAME: James D Apgar is a 64 y.o. male  : 1960    MRN: [id number]  DATE: 2024  TIME: 10:27 AM    Assessment and Plan   Acute idiopathic gout of right knee [M10.061]  1. Acute idiopathic gout of right knee        2. Acute gout of right knee, unspecified cause  predniSONE 10 mg tablet    indomethacin (INDOCIN) 50 mg capsule            Patient Instructions       Follow up with PCP in 3-5 days.  Proceed to  ER if symptoms worsen.    If tests are performed, our office will contact you with results only if changes need to made to the care plan discussed with you at the visit. [facility]    Chief Complaint     Chief Complaint   Patient presents with    Knee Pain      Pt c/o right sided knee pain since . Pt thinks he is having a gout flare-up. Pt also commented he had a stomach bug over the weekend and it resolved. Pt states he gets nauseas from walking due to the pain.          History of Present Illness       Had flare up one month ago and improved with treatments. Had stomach virus over weekend and thinks dehydration triggered flare up. Takes allopurinol daily for gout prevention.     Knee Pain   The incident occurred 3 to 5 days ago. Incident location: none. There was no injury mechanism. The pain is present in the right knee. The quality of the pain is described as aching. The pain is at a severity of 6/10. The pain is moderate. The pain has been Constant since onset. Associated symptoms include an inability to bear weight (slight limp with ambulation). Pertinent negatives include no loss of motion, loss of sensation, muscle weakness, numbness or tingling. The symptoms are aggravated by movement, palpation and weight bearing. He has tried acetaminophen and NSAIDs for the symptoms.       Review of Systems   Review of Systems   Constitutional:  Positive for activity change. Negative for chills, fatigue and fever.    Cardiovascular:  Negative for leg swelling.   Musculoskeletal:  Positive for arthralgias and joint swelling. Negative for myalgias.   Skin:  Negative for color change, rash and wound.   Neurological:  Negative for tingling and numbness.   Hematological:  Negative for adenopathy. Does not bruise/bleed easily.         Current Medications       Current Outpatient Medications:     allopurinol (ZYLOPRIM) 100 mg tablet, Take 100 mg by mouth daily, Disp: , Rfl:     indomethacin (INDOCIN) 50 mg capsule, Take 1 capsule (50 mg total) by mouth 3 (three) times a day with meals for 7 days, Disp: 21 capsule, Rfl: 0    Multiple Vitamin (MULTIVITAMIN) capsule, Take by mouth daily, Disp: , Rfl:     Papav-Phentolamine-Alprostadil (PGE1 / PHENTOLAMINE / PAPAVERINE, STANDARD TRIMIX, 20 MCG / 1 MG / 30 MG INJECTION), by Intracavernosal route once, Disp: , Rfl:     predniSONE 10 mg tablet, Take 4 tablets (40 mg total) by mouth daily for 3 days, THEN 2 tablets (20 mg total) daily for 3 days, THEN 1 tablet (10 mg total) daily for 3 days., Disp: 24 tablet, Rfl: 0    sildenafil (REVATIO) 20 mg tablet, Take 1-5 tablets as needed for ED (Patient not taking: Reported on 8/5/2024), Disp: 90 tablet, Rfl: 3    Current Allergies     Allergies as of 08/07/2024    (No Known Allergies)            The following portions of the patient's history were reviewed and updated as appropriate: allergies, current medications, past family history, past medical history, past social history, past surgical history and problem list.     Past Medical History:   Diagnosis Date    Asthma     in younger years    Atrial fibrillation (HCC)     Basal cell carcinoma 04/2023    left cheek    BPH (benign prostatic hypertrophy) 09/2020    CPAP (continuous positive airway pressure) dependence     H/O cardiac radiofrequency ablation     Hard to intubate 2014    given card but lost it    History of transfusion     Sleep apnea        Past Surgical History:   Procedure Laterality  Date    APPENDECTOMY      CARDIAC ELECTROPHYSIOLOGY STUDY AND ABLATION      CARDIAC SURGERY  1968    Patent Ductus    COLONOSCOPY      MOHS SURGERY Left 04/12/2023    BCC left cheek-Dr Senft    DC BX PROSTATE STRTCTC SATURATION SAMPLING IMG GID N/A 05/09/2023    Procedure: TRANSPERINEAL MRI FUSION  BIOPSY PROSTATE;  Surgeon: Gavino Singh MD;  [facility];  Service: Urology    DC LAPS SURG [id number] RPBIC RAD W/NRV SPARING ROBOT N/A 6/26/2023    Procedure: PROSTATECTOMY RADICAL LAPAROSCOPIC W/ ROBOTICS;  Surgeon: Earnest Hernandez MD;  [facility];  Service: Urology    TONSILLECTOMY         Family History   Problem Relation Age of Onset    Cancer Mother     Cancer Father     Diabetes Maternal Grandmother     Cancer Maternal Grandmother     Diabetes Paternal Grandmother     Cancer Paternal Grandmother     Cancer Paternal Grandfather          Medications have been verified.        Objective   /78   Pulse 97   Temp (!) 96.5 °F (35.8 °C)   Resp 17   Wt 113 kg (250 lb)   SpO2 95%   BMI 32.10 kg/m²        Physical Exam     Physical Exam  Vitals reviewed.   Constitutional:       General: He is not in acute distress.     Appearance: Normal appearance. He is not ill-appearing.   HENT:      Head: Normocephalic and atraumatic.   Cardiovascular:      Rate and Rhythm: Normal rate and regular rhythm.      Heart sounds: Normal heart sounds, S1 normal and S2 normal.   Pulmonary:      Effort: Pulmonary effort is normal. No accessory muscle usage.      Breath sounds: Normal breath sounds. No wheezing.   Musculoskeletal:      Right knee: No effusion or erythema. Tenderness present over the lateral joint line.      Left knee: Normal.   Skin:     General: Skin is warm and dry.      Capillary Refill: Capillary refill takes less than 2 seconds.      Findings: No rash.   Neurological:      General: No focal deficit present.      Mental Status: He is alert and oriented to person, place, and time.      Motor:  Motor function is intact.   Psychiatric:         Attention and Perception: Attention and perception normal.         Mood and Affect: Mood and affect normal.         Speech: Speech normal.         Behavior: Behavior normal. Behavior is cooperative.         Thought Content: Thought content normal.                    [Negative] : Heme/Lymph [FreeTextEntry5] : See HPI [FreeTextEntry6] : See HPI

## 2024-08-19 PROBLEM — R97.20 ELEVATED PSA: Status: RESOLVED | Noted: 2023-05-22 | Resolved: 2024-08-19

## 2024-08-19 PROBLEM — K58.0 IRRITABLE BOWEL SYNDROME WITH DIARRHEA: Status: RESOLVED | Noted: 2019-04-30 | Resolved: 2024-08-19

## 2024-08-19 PROBLEM — Z71.2 ENCOUNTER TO DISCUSS TEST RESULTS: Status: RESOLVED | Noted: 2023-05-22 | Resolved: 2024-08-19

## 2024-08-19 NOTE — PROGRESS NOTES
Ambulatory Visit  Name: James D Apgar      : 1960      MRN: [id number]  Encounter Provider: Trinidad Arias DO  Encounter Date: 2024   Encounter department: [facility]    Assessment & Plan   1. Prostate cancer (HCC)  Assessment & Plan:  F/u with Urology as scheduled. Pt does not he doesn't feel like he is back to himself since his treatment. Discussed consideration of therapy to help process cancer diagnosis (and potentially previous trauma for work) as well as learning coping mechanisms to help manage difficulties with aging -- pt agreeable, referral placed to in office LCSW.   Orders:  -     Ambulatory referral to Psych Services; Future  2. Irritable bowel syndrome with both constipation and diarrhea  Assessment & Plan:  Encouraged good hydration, plenty of fiber, regular physical activity. Encouraged to monitor for certain trigger foods. Also likely related to stress -- will refer to in office LCSW as above.   3. Chronic gout without tophus, unspecified cause, unspecified site  Assessment & Plan:  Update uric acid. Encouraged good hydration. Pt considering restarting vitamin C supplement as this previously helped decrease flares for him.   Orders:  -     Uric acid; Future  4. History of atrial fibrillation  Assessment & Plan:  RRR on exam, asymptomatic since procedure.   5. H/O cardiac radiofrequency ablation  6. Obstructive sleep apnea  Assessment & Plan:  Continue CPAP   7. Healthcare maintenance  8. Screening for diabetes mellitus  -     Comprehensive metabolic panel; Future  9. Screening, lipid  -     Lipid panel; Future  10. Screening for HIV (human immunodeficiency virus)  -     HIV 1/2 AG/AB w Reflex [facility] for 2 yr old and above; Future  11. Encounter for hepatitis C screening test for low risk patient  -     Hepatitis C antibody; Future       Health Maintenance:   BP WNL   CMP + Lipids to screen for HLD, DM   HIV, Hep C screening ordered, pt agreeable   CRC UTD  PSA  "UTD   Vaccinations: TDap UTD, Shingles UTD, COVID completed primary + booster   Encouraged regular physical activity, varied diet, and regular dental/eye exams         History of Present Illness     HPI    Pt presents for establish care.     AFib s/p ablation in 2014: Asymptomatic since ablation   Prostate CA s/p prostatectomy in 2023: Following with Urology, PSA has been undetected   ADDIE: Wearing CPAP   Gout: Had two recent flares, thought to be dehydrated     Review of Systems   Constitutional:  Negative for unexpected weight change.   HENT:  Positive for congestion (\"a little bit\"). Negative for ear pain, rhinorrhea and sore throat.    Eyes:  Negative for visual disturbance.   Respiratory:  Negative for cough and shortness of breath.    Cardiovascular:  Negative for chest pain, palpitations and leg swelling.   Gastrointestinal:  Positive for abdominal distention, abdominal pain, constipation and diarrhea (not necessarily \"explosive\"). Negative for blood in stool.        \"My stomach's always been a mess\"   Endocrine: Negative for polyuria.   Genitourinary:  Negative for dysuria and hematuria.   Musculoskeletal:  Positive for neck pain.        \"My balance seems odd sometimes\"   Neurological:  Negative for dizziness and headaches.   Psychiatric/Behavioral:  Positive for sleep disturbance.      Medical History Reviewed by provider this encounter:  Tobacco  Allergies  Meds  Problems  Med Hx  Surg Hx  Fam Hx       Objective     /78   Pulse 88   Temp (!) 97.4 °F (36.3 °C)   Ht 6' 2\" (1.88 m)   Wt 114 kg (251 lb)   SpO2 95%   BMI 32.23 kg/m²     Physical Exam  Vitals and nursing note reviewed.   Constitutional:       General: He is not in acute distress.     Appearance: Normal appearance. He is well-developed.   HENT:      Head: Normocephalic and atraumatic.      Right Ear: Tympanic membrane, ear canal and external ear normal.      Left Ear: Tympanic membrane, ear canal and external ear normal.      " Nose: Nose normal. No rhinorrhea.      Mouth/Throat:      Mouth: Mucous membranes are moist.      Pharynx: No oropharyngeal exudate or posterior oropharyngeal erythema.   Eyes:      Conjunctiva/sclera: Conjunctivae normal.   Neck:      Thyroid: No thyromegaly.   Cardiovascular:      Rate and Rhythm: Normal rate and regular rhythm.   Pulmonary:      Effort: Pulmonary effort is normal. No respiratory distress.      Breath sounds: Normal breath sounds.   Abdominal:      General: Bowel sounds are normal. There is no distension.      Palpations: Abdomen is soft.      Tenderness: There is no abdominal tenderness.   Musculoskeletal:      Right lower leg: No edema.      Left lower leg: No edema.   Lymphadenopathy:      Cervical: No cervical adenopathy.   Skin:     General: Skin is warm and dry.   Neurological:      Mental Status: He is alert.      Comments: Grossly intact   Psychiatric:         Mood and Affect: Mood normal.       Administrative Statements

## 2024-08-20 ENCOUNTER — APPOINTMENT (OUTPATIENT)
Dept: LAB | Facility: CLINIC | Age: 64
End: 2024-08-20
Payer: COMMERCIAL

## 2024-08-20 ENCOUNTER — OFFICE VISIT (OUTPATIENT)
Dept: FAMILY MEDICINE CLINIC | Facility: CLINIC | Age: 64
End: 2024-08-20
Payer: COMMERCIAL

## 2024-08-20 ENCOUNTER — TELEPHONE (OUTPATIENT)
Age: 64
End: 2024-08-20

## 2024-08-20 VITALS
OXYGEN SATURATION: 95 % | HEIGHT: 74 IN | WEIGHT: 251 LBS | BODY MASS INDEX: 32.21 KG/M2 | DIASTOLIC BLOOD PRESSURE: 78 MMHG | TEMPERATURE: 97.4 F | HEART RATE: 88 BPM | SYSTOLIC BLOOD PRESSURE: 136 MMHG

## 2024-08-20 DIAGNOSIS — Z11.4 SCREENING FOR HIV (HUMAN IMMUNODEFICIENCY VIRUS): ICD-10-CM

## 2024-08-20 DIAGNOSIS — Z86.79 HISTORY OF ATRIAL FIBRILLATION: ICD-10-CM

## 2024-08-20 DIAGNOSIS — G47.33 OBSTRUCTIVE SLEEP APNEA: ICD-10-CM

## 2024-08-20 DIAGNOSIS — Z13.220 SCREENING, LIPID: ICD-10-CM

## 2024-08-20 DIAGNOSIS — Z11.59 ENCOUNTER FOR HEPATITIS C SCREENING TEST FOR LOW RISK PATIENT: ICD-10-CM

## 2024-08-20 DIAGNOSIS — Z98.890 H/O CARDIAC RADIOFREQUENCY ABLATION: ICD-10-CM

## 2024-08-20 DIAGNOSIS — Z13.1 SCREENING FOR DIABETES MELLITUS: ICD-10-CM

## 2024-08-20 DIAGNOSIS — M1A.9XX0 CHRONIC GOUT WITHOUT TOPHUS, UNSPECIFIED CAUSE, UNSPECIFIED SITE: ICD-10-CM

## 2024-08-20 DIAGNOSIS — C61 PROSTATE CANCER (HCC): ICD-10-CM

## 2024-08-20 DIAGNOSIS — K58.2 IRRITABLE BOWEL SYNDROME WITH BOTH CONSTIPATION AND DIARRHEA: ICD-10-CM

## 2024-08-20 DIAGNOSIS — C61 PROSTATE CANCER (HCC): Primary | ICD-10-CM

## 2024-08-20 DIAGNOSIS — Z00.00 HEALTHCARE MAINTENANCE: ICD-10-CM

## 2024-08-20 PROBLEM — M10.9 GOUT: Status: ACTIVE | Noted: 2024-08-20

## 2024-08-20 LAB
ALBUMIN SERPL BCG-MCNC: 4.2 G/DL (ref 3.5–5)
ALP SERPL-CCNC: 71 U/L (ref 34–104)
ALT SERPL W P-5'-P-CCNC: 33 U/L (ref 7–52)
ANION GAP SERPL CALCULATED.3IONS-SCNC: 9 MMOL/L (ref 4–13)
AST SERPL W P-5'-P-CCNC: 23 U/L (ref 13–39)
BILIRUB SERPL-MCNC: 0.67 MG/DL (ref 0.2–1)
BUN SERPL-MCNC: 18 MG/DL (ref 5–25)
CALCIUM SERPL-MCNC: 9.3 MG/DL (ref 8.4–10.2)
CHLORIDE SERPL-SCNC: 103 MMOL/L (ref 96–108)
CHOLEST SERPL-MCNC: 165 MG/DL
CO2 SERPL-SCNC: 24 MMOL/L (ref 21–32)
CREAT SERPL-MCNC: 0.87 MG/DL (ref 0.6–1.3)
GFR SERPL CREATININE-BSD FRML MDRD: 92 ML/MIN/1.73SQ M
GLUCOSE P FAST SERPL-MCNC: 104 MG/DL (ref 65–99)
HCV AB SER QL: NORMAL
HDLC SERPL-MCNC: 34 MG/DL
HIV 1+2 AB+HIV1 P24 AG SERPL QL IA: NORMAL
HIV 2 AB SERPL QL IA: NORMAL
HIV1 AB SERPL QL IA: NORMAL
HIV1 P24 AG SERPL QL IA: NORMAL
LDLC SERPL CALC-MCNC: 105 MG/DL (ref 0–100)
NONHDLC SERPL-MCNC: 131 MG/DL
POTASSIUM SERPL-SCNC: 4.3 MMOL/L (ref 3.5–5.3)
PROT SERPL-MCNC: 7.2 G/DL (ref 6.4–8.4)
SODIUM SERPL-SCNC: 136 MMOL/L (ref 135–147)
TRIGL SERPL-MCNC: 131 MG/DL
URATE SERPL-MCNC: 7.1 MG/DL (ref 3.5–8.5)

## 2024-08-20 PROCEDURE — 86803 HEPATITIS C AB TEST: CPT

## 2024-08-20 PROCEDURE — 99386 PREV VISIT NEW AGE 40-64: CPT | Performed by: FAMILY MEDICINE

## 2024-08-20 PROCEDURE — 80053 COMPREHEN METABOLIC PANEL: CPT

## 2024-08-20 PROCEDURE — 36415 COLL VENOUS BLD VENIPUNCTURE: CPT

## 2024-08-20 PROCEDURE — 99213 OFFICE O/P EST LOW 20 MIN: CPT | Performed by: FAMILY MEDICINE

## 2024-08-20 PROCEDURE — 80061 LIPID PANEL: CPT

## 2024-08-20 PROCEDURE — 87389 HIV-1 AG W/HIV-1&-2 AB AG IA: CPT

## 2024-08-20 PROCEDURE — 84550 ASSAY OF BLOOD/URIC ACID: CPT

## 2024-08-20 NOTE — ASSESSMENT & PLAN NOTE
F/u with Urology as scheduled. Pt does not he doesn't feel like he is back to himself since his treatment. Discussed consideration of therapy to help process cancer diagnosis (and potentially previous trauma for work) as well as learning coping mechanisms to help manage difficulties with aging -- pt agreeable, referral placed to in office LCSW.

## 2024-08-20 NOTE — ASSESSMENT & PLAN NOTE
Encouraged good hydration, plenty of fiber, regular physical activity. Encouraged to monitor for certain trigger foods. Also likely related to stress -- will refer to in office LCSW as above.

## 2024-08-20 NOTE — ASSESSMENT & PLAN NOTE
Update uric acid. Encouraged good hydration. Pt considering restarting vitamin C supplement as this previously helped decrease flares for him.

## 2024-09-06 ENCOUNTER — OFFICE VISIT (OUTPATIENT)
Dept: BEHAVIORAL/MENTAL HEALTH CLINIC | Facility: CLINIC | Age: 64
End: 2024-09-06
Payer: COMMERCIAL

## 2024-09-06 DIAGNOSIS — C61 PROSTATE CANCER (HCC): ICD-10-CM

## 2024-09-06 DIAGNOSIS — F41.1 GENERALIZED ANXIETY DISORDER: Primary | ICD-10-CM

## 2024-09-06 PROCEDURE — 90791 PSYCH DIAGNOSTIC EVALUATION: CPT | Performed by: SOCIAL WORKER

## 2024-09-06 NOTE — BH CRISIS PLAN
Client Name: Jim D Apgar       Client YOB: 1960    Jseus-Kunal Safety Plan    Creation Date: 24 Update Date: 25   Created By: Ashley Aburto       Step 1: Warning Signs:   Warning Signs   insecurity   anxiety   hard time finding jie in things            Step 2: Internal Coping Strategies:   Internal Coping Strategies   go for a walk            Step 3: People and social settings that provide distraction:   Name Contact Information   Son's In cell phone    Places   Work         Step 4: People whom I can ask for help during a crisis:    Name Contact Information    Anna (wife) In cell phone      Step 5: Professionals or agencies I can contact during a crisis:    Clinican/Agency Name Phone Emergency Contact    None      Local Emergency Department Emergency Department Phone Emergency Department Address    [phone] [facility] [address]      Crisis Phone Numbers:   Suicide Prevention Lifeline: Call or Text  988 Crisis Text Line: Text HOME to 066-999   Please note: Some [facility] do not have a separate number for Child/Adolescent specific crisis. If your county is not listed under Child/Adolescent, please call the adult number for your county      Adult Crisis Numbers: Child/Adolescent Crisis Numbers   [facility]: [phone] [facility]: [phone]   [facility]: [phone] [facility]: [phone]   [facility]: [phone] [place]: [phone]   [facility]: [phone] [facility]: [phone]   [facility]: [phone]   [facility]: [phone]   [facility]: [phone]   [facility]: [phone] (daytime) [phone] (after hours, weekends, holidays)      Step 6: Making the environment safer (plan for lethal means safety):   Plan: 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       Optional: What is most important to me and  "worth living for?   \"My family\"     Arie Safety Plan. Janki Lee and Joe Syed. Used with permission of the authors.           "

## 2024-09-06 NOTE — PSYCH
Behavioral Health Psychotherapy Assessment    Date of Initial Psychotherapy Assessment: 24  Referral Source: Dr. Arias  Has a release of information been signed for the referral source? Yes    Preferred Name: Jim D Apgar  Preferred Pronouns: He/him  YOB: 1960 Age: 64 y.o.  Sex assigned at birth: male   Gender Identity: Male  Race:   Preferred Language: English    Emergency Contact:  Full Name: Kristi Apgar  Relationship to Client: Wife  Contact information: [phone]    Primary Care Physician:  Trinidad Arias,   [address]  [place] [id number]  [phone]  Has a release of information been signed? Yes    Physical Health History:  Past surgical procedures: Heart surgery at 8, appendectomy, cryo ablation, radical proctectomy  Do you have a history of any of the following: heart/cardiac issues  Do you have any mobility issues? No    Relevant Family History:  He lives with his wife, they have 4 adult children from a different mother,  They are 37, 35, 34 and 29 and they have a great relationship.  Then he has a 10 and a 13 year old boy and they are with this wife.  He does not get along with his ex wife and they were  for 15 years.  He and his current wife are  for 17 years.  He has a good marriage, not great but good.  Parents are  and an older sister and they do not get along, growing up they were never close, father  18 years ago and mom  9 years ago after semi extended illnesses.  There were financial issues with her re mortgaging their mothers house.    Presenting Problem (What brings you in?)  He has been having a hard time since his cancer diagnosis.  His entire life he has been pretty rough and tumble, and he was always the person to handle other peoples problems.  He had no symptoms and he could not believe the diagnosis.  He previously has felt indestructible.      Mental Health Advance Directive:  Do you currently have a  Mental Health Advance Directive?no    Diagnosis:   Diagnosis ICD-10-CM Associated Orders   1. Generalized anxiety disorder  F41.1       2. Prostate cancer (HCC)  C61 Ambulatory referral to Psych Services          Initial Assessment:     Current Mental Status:    Appearance: appropriate, casual and neat      Behavior/Manner: cooperative      Affect/Mood:  Anxious    Speech:  Talkative and normal    Sleep:  Interrupted and insomnia    Oriented to: oriented to self, oriented to place and oriented to time       Clinical Symptoms    Depression: yes      Anxiety: yes      Depression Symptoms: depressed mood, restlessness, serious loss of interest in things, social isolation, poor concentration, sleep disturbance, insomnia, decreased libido and irritable      Anxiety Symptoms: excessive worry, irritable, nervous/anxious, difficulty controlling worry, restlessness and dizziness      Have you ever been assaultive to others or the environment: No      Have you ever been self-injurious: No      Counseling History:  Previous Counseling or Treatment  (Mental Health or Drug & Alcohol): No    Have you previously taken psychiatric medications: No      Suicide Risk Assessment  Have you ever had a suicide attempt: No    Have you had incidents of suicidal ideation: No    Are you currently experiencing suicidal thoughts: No      Substance Abuse/Addiction Assessment:  Alcohol: No    Heroin: No    Fentanyl: No    Opiates: No    Cocaine: No    Amphetamines: No    Hallucinogens: No    Club Drugs: No    Benzodiazepines: No    Other Rx Meds: No    Marijuana: No    Tobacco/Nicotine: No    Have you experienced blackouts as a result of substance use: No    Have you had any periods of abstinence: No    Have you experienced symptoms of withdrawal: No    Have you ever overdosed on any substances?: No    Are you currently using any Medication Assisted Treatment for Substance Use: No      Compulsive Behaviors:  Compulsive Behavior Information:   Denies any compulsive behaviors    Disordered Eating History:  Do you have a history of disordered eating: No      Social Determinants of Health:    SDOH:  Stress    Trauma and Abuse History:    Have you ever been abused: No      Legal History:    Have you ever been arrested  or had a DUI: No      Have you been incarcerated: No      Are you currently on parole/probation: No      Any current Children and Youth involvement: No      Any pending legal charges: No      Relationship History:    Current marital status:       Natural Supports:  Other    Other natural supports:  His dog    Relationship History:  He does not talk to anyone when he is not doing well.    He does not get along with his first wife and she is homeless now and they do not even know where she is.    Second wife is a good marriage and they met when she was working at [employer] and he was working midnights as a  and there is a 20 year age difference    Employment History    Are you currently employed: Yes      Longest period of employment:  27 years    Employer/ Job title:   at [employer]    Sources of income/financial support:  Work     History:      Status: no history of  duty  Educational History:     Have you ever been diagnosed with a learning disability: No      Highest level of education:  Some college    Have you ever had an IEP or 504-plan: No      Do you need assistance with reading or writing: No      Recommended Treatment:     Psychotherapy:  Individual sessions    Frequency:  2 times    Session frequency:  Monthly    Visit start and stop times:    09/06/24  Start Time: 0922  Stop Time: 1009  Total Visit Time: 47 minutes

## 2024-09-06 NOTE — BH TREATMENT PLAN
"Outpatient Behavioral Health Psychotherapy Treatment Plan    Jim D Apgar  1960     Date of Initial Psychotherapy Assessment: 2024   Date of Current Treatment Plan: 24  Treatment Plan Target Date: 2025  Treatment Plan Expiration Date: 2025    Diagnosis:   1. Generalized anxiety disorder        2. Prostate cancer (HCC)  Ambulatory referral to Psych Services          Area(s) of Need: In need of processing trauma in his past from work and his own mortality    Long Term Goal 1 (in the client's own words): \"I want to get my confidence back\"    Stage of Change: Preparation    Target Date for completion: 2025     Anticipated therapeutic modalities: CBT, DBT, MIndfulness     People identified to complete this goal: Therapist and Speedy      Objective 1: (identify the means of measuring success in meeting the objective): Speedy will process his past trauma in as much detail as he is comfortable with.      Objective 2: (identify the means of measuring success in meeting the objective): Learn and implement mindfulness techniques daily and process efficacy in biweekly sessions.          I am currently under the care of a [facility] psychiatric provider: no    My [facility] psychiatric provider is: N/A    I am currently taking psychiatric medications: No    I feel that I will be ready for discharge from mental health care when I reach the following (measurable goal/objective): \"Just be happy again\"    For children and adults who have a legal guardian:   Has there been any change to custody orders and/or guardianship status? NA. If yes, attach updated documentation.    I have created my Crisis Plan and have been offered a copy of this plan    Behavioral Health Treatment Plan [facility]: Diagnosis and Treatment Plan explained to Speedy NELIA ManleyApgar Speedy D Apgar acknowledges an understanding of their diagnosis. pSeedy Manleygar agrees to this treatment plan.    I have been offered a copy of this Treatment Plan. " yes      Electronically Signed by Ashley Aburto

## 2024-10-04 ENCOUNTER — SOCIAL WORK (OUTPATIENT)
Dept: BEHAVIORAL/MENTAL HEALTH CLINIC | Facility: CLINIC | Age: 64
End: 2024-10-04
Payer: COMMERCIAL

## 2024-10-04 DIAGNOSIS — F41.1 GENERALIZED ANXIETY DISORDER: Primary | ICD-10-CM

## 2024-10-04 PROCEDURE — 90837 PSYTX W PT 60 MINUTES: CPT | Performed by: SOCIAL WORKER
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
## 2024-10-25 ENCOUNTER — SOCIAL WORK (OUTPATIENT)
Dept: BEHAVIORAL/MENTAL HEALTH CLINIC | Facility: CLINIC | Age: 64
End: 2024-10-25
Payer: COMMERCIAL

## 2024-10-25 DIAGNOSIS — F41.1 GENERALIZED ANXIETY DISORDER: Primary | ICD-10-CM

## 2024-10-25 PROCEDURE — 90834 PSYTX W PT 45 MINUTES: CPT | Performed by: SOCIAL WORKER

## 2024-10-25 NOTE — PSYCH
Behavioral Health Psychotherapy Progress Note    Psychotherapy Provided: Individual Psychotherapy     1. Generalized anxiety disorder            Goals addressed in session: Goal 1     DATA: Speedy states that his anxiety is worse at night.  He is struggling to shut his thoughts off.  He does not like asking for help.  He will ask for help if it is something he does not understand.  We processed that asking for help is a strength not a weakness.  He was encouraged to focus on what he can do and not what he cannot do.  We processed what he can do at length.  He is appropriately dressed for work, and he is adequately groomed being overweight.  His thought process is logical and speech is normal rate, volume and content.  We processed his physical intimacy.  His wife is 20 years younger than him being only 42. They have been  for 18 years now.  He is recognizing that he puts too much pressure on himself.  He struggles in social situations and he was never a social butterfly, but now he does not like being around people even family.  He feels that from what the people closest to him know what he went through and view him differently.  We focused on his not knowing what people are thinking unless he asks them.  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  He was able to see things from a different perspective.  He worries about the future, and we processed staying in the here and now.  Went over mindfulness techniques.      During this session, this clinician used the following therapeutic modalities: Cognitive Behavioral Therapy, Mindfulness-based Strategies, Solution-Focused Therapy, and Supportive Psychotherapy    Substance Abuse was not addressed during this session. If the client is diagnosed with a co-occurring substance use disorder, please indicate any changes in the frequency or amount of use: N/A. Stage of change for addressing substance use diagnoses: No substance use/Not  "applicable    ASSESSMENT:  Jim D Apgar presents with a Euthymic/ normal mood his affect is Normal range and intensity, which is congruent, with his mood and the content of the session. The client has made progress on their goals.     Jim D Apgar presents with a minimal risk of suicide, minimal risk of self-harm, and minimal risk of harm to others.    For any risk assessment that surpasses a \"low\" rating, a safety plan must be developed.    A safety plan was indicated: no  If yes, describe in detail N/A    PLAN: Between sessions, Jim D Apgar will try and stay in the here and now. At the next session, the therapist will use Cognitive Behavioral Therapy, Mindfulness-based Strategies, and Supportive Psychotherapy to address his negative self view since his surgery,.    Behavioral Health Treatment Plan and Discharge Planning: Jim D Apgar is aware of and agrees to continue to work on their treatment plan. They have identified and are working toward their discharge goals. yes    Visit start and stop times:    10/25/24  Start Time: 829                                                 "

## 2025-01-06 ENCOUNTER — TELEPHONE (OUTPATIENT)
Dept: PSYCHIATRY | Facility: CLINIC | Age: 65
End: 2025-01-06

## 2025-01-06 NOTE — TELEPHONE ENCOUNTER
Writer PETEY for call back per therapist request to see if patient would like to continue services.

## 2025-02-13 ENCOUNTER — APPOINTMENT (OUTPATIENT)
Dept: LAB | Facility: CLINIC | Age: 65
End: 2025-02-13
Payer: COMMERCIAL

## 2025-02-13 LAB — PSA SERPL-MCNC: <0.008 NG/ML (ref 0–4)

## 2025-02-18 ENCOUNTER — PATIENT MESSAGE (OUTPATIENT)
Dept: UROLOGY | Facility: CLINIC | Age: 65
End: 2025-02-18

## 2025-02-18 NOTE — PATIENT COMMUNICATION
Patient rescheduled appointment for 5/8/25 with Serge.     Last PSA       Component  Ref Range & Units (hover) 2/13/25 1233 6/13/24 1004 1/23/24 1144 10/9/23 1432 2/3/23 0802 3/23/22 0941 4/13/21 1122   PSA, Diagnostic <0.008 <0.008 CM              Please advise if you need sooner appointment with patient.

## 2025-02-24 RX ORDER — COLCHICINE 0.6 MG/1
TABLET ORAL
COMMUNITY
Start: 2025-01-27

## 2025-02-25 ENCOUNTER — OFFICE VISIT (OUTPATIENT)
Dept: GASTROENTEROLOGY | Facility: CLINIC | Age: 65
End: 2025-02-25
Payer: COMMERCIAL

## 2025-02-25 VITALS
HEIGHT: 74 IN | HEART RATE: 79 BPM | OXYGEN SATURATION: 97 % | WEIGHT: 255 LBS | SYSTOLIC BLOOD PRESSURE: 154 MMHG | BODY MASS INDEX: 32.73 KG/M2 | TEMPERATURE: 97.6 F | DIASTOLIC BLOOD PRESSURE: 78 MMHG

## 2025-02-25 DIAGNOSIS — Z12.11 SCREENING FOR COLON CANCER: ICD-10-CM

## 2025-02-25 DIAGNOSIS — Z86.0100 PERSONAL HISTORY OF COLON POLYPS, UNSPECIFIED: Primary | ICD-10-CM

## 2025-02-25 PROCEDURE — 99203 OFFICE O/P NEW LOW 30 MIN: CPT | Performed by: PHYSICIAN ASSISTANT

## 2025-02-25 NOTE — H&P (VIEW-ONLY)
Name: James D Apgar      : 1960      MRN: [id number]  Encounter Provider: Alejandra Georges PA-C  Encounter Date: 2025   Encounter department: [facility] [place]  :  Assessment & Plan  Personal history of colon polyps, unspecified  - He has a personal history of polyps, last colonoscopy in 2019 he had 1 tubular adenoma removed and recommended to come back in 3 years which was delayed due to having prostate cancer  - No alarm symptoms  - Schedule colonoscopy with miralax prep  Orders:    Colonoscopy; Future    Screening for colon cancer    Orders:    Colonoscopy; Future        History of Present Illness   HPI  James D Apgar is a 64 y.o. male who presents to schedule his colonoscopy. He was due in  but had been dealing with a prostatae  cancer diagnosis so this was delayed. He is doing well now. He denies any GI problems such as change in bowel habits, abdominal pain, or blood in the stool.  He denies any problems with anesthesia or with the bowel prep when he had his last colonoscopy in 2019.  He denies any issues with acid reflux, nausea, or vomiting.  He does use a CPAP for sleep apnea.  He denies any other new cardiac or pulmonary diagnoses in the past several years.  History obtained from: patient    Review of Systems  Medical History Reviewed by provider this encounter:  Meds  Problems     .  Current Outpatient Medications on File Prior to Visit   Medication Sig Dispense Refill    allopurinol (ZYLOPRIM) 100 mg tablet Take 100 mg by mouth daily      colchicine (COLCRYS) 0.6 mg tablet START WITH 2 TABLETS BY MOUTH THEN REPEAT 1 TABLET IN ONE HOUR      indomethacin (INDOCIN) 50 mg capsule Take 1 capsule (50 mg total) by mouth 3 (three) times a day with meals for 7 days 21 capsule 0    Multiple Vitamin (MULTIVITAMIN) capsule Take by mouth daily      Papav-Phentolamine-Alprostadil (PGE1 / PHENTOLAMINE / PAPAVERINE, STANDARD TRIMIX, 20 MCG / 1 MG / 30 MG INJECTION) by  "Intracavernosal route once       No current facility-administered medications on file prior to visit.         Objective   /78 (BP Location: Left arm, Patient Position: Sitting, Cuff Size: Standard)   Pulse 79   Temp 97.6 °F (36.4 °C) (Tympanic)   Ht 6' 2\" (1.88 m)   Wt 116 kg (255 lb)   SpO2 97%   BMI 32.74 kg/m²      Physical Exam  General- Appears well, no distress  CV- RRR, no murmur  Pulm- CTA bilaterally, no respiratory distress  "

## 2025-02-25 NOTE — PATIENT INSTRUCTIONS
Scheduled date of colonoscopy (as of today):3/20/25  Physician performing colonoscopy:Nish  Location of colonoscopy:[facility] [place]  Bowel prep reviewed with patient:Ranjana/Miralax  Instructions reviewed with patient by:Jefry ingram  Clearances:  none

## 2025-02-25 NOTE — PROGRESS NOTES
Name: James D Apgar      : 1960      MRN: [id number]  Encounter Provider: Alejandra Georges PA-C  Encounter Date: 2025   Encounter department: [facility] [place]  :  Assessment & Plan  Personal history of colon polyps, unspecified  - He has a personal history of polyps, last colonoscopy in 2019 he had 1 tubular adenoma removed and recommended to come back in 3 years which was delayed due to having prostate cancer  - No alarm symptoms  - Schedule colonoscopy with miralax prep  Orders:    Colonoscopy; Future    Screening for colon cancer    Orders:    Colonoscopy; Future        History of Present Illness   HPI  James D Apgar is a 64 y.o. male who presents to schedule his colonoscopy. He was due in  but had been dealing with a prostatae  cancer diagnosis so this was delayed. He is doing well now. He denies any GI problems such as change in bowel habits, abdominal pain, or blood in the stool.  He denies any problems with anesthesia or with the bowel prep when he had his last colonoscopy in 2019.  He denies any issues with acid reflux, nausea, or vomiting.  He does use a CPAP for sleep apnea.  He denies any other new cardiac or pulmonary diagnoses in the past several years.  History obtained from: patient    Review of Systems  Medical History Reviewed by provider this encounter:  Meds  Problems     .  Current Outpatient Medications on File Prior to Visit   Medication Sig Dispense Refill    allopurinol (ZYLOPRIM) 100 mg tablet Take 100 mg by mouth daily      colchicine (COLCRYS) 0.6 mg tablet START WITH 2 TABLETS BY MOUTH THEN REPEAT 1 TABLET IN ONE HOUR      indomethacin (INDOCIN) 50 mg capsule Take 1 capsule (50 mg total) by mouth 3 (three) times a day with meals for 7 days 21 capsule 0    Multiple Vitamin (MULTIVITAMIN) capsule Take by mouth daily      Papav-Phentolamine-Alprostadil (PGE1 / PHENTOLAMINE / PAPAVERINE, STANDARD TRIMIX, 20 MCG / 1 MG / 30 MG INJECTION) by  "Intracavernosal route once       No current facility-administered medications on file prior to visit.         Objective   /78 (BP Location: Left arm, Patient Position: Sitting, Cuff Size: Standard)   Pulse 79   Temp 97.6 °F (36.4 °C) (Tympanic)   Ht 6' 2\" (1.88 m)   Wt 116 kg (255 lb)   SpO2 97%   BMI 32.74 kg/m²      Physical Exam  General- Appears well, no distress  CV- RRR, no murmur  Pulm- CTA bilaterally, no respiratory distress  "

## 2025-03-18 ENCOUNTER — DOCUMENTATION (OUTPATIENT)
Age: 65
End: 2025-03-18

## 2025-03-18 ENCOUNTER — TELEPHONE (OUTPATIENT)
Dept: PSYCHIATRY | Facility: CLINIC | Age: 65
End: 2025-03-18

## 2025-03-18 DIAGNOSIS — F41.1 GENERALIZED ANXIETY DISORDER: Primary | ICD-10-CM

## 2025-03-18 NOTE — PROGRESS NOTES
Psychotherapy Discharge Summary    Preferred Name: Jim D Apgar  YOB: 1960    Admission date to psychotherapy: 2024    Referred by: Dr. Arias    Presenting Problem: Generalized anxiety disorder     Course of treatment included : individual therapy     Progress/Outcome of Treatment Goals (brief summary of course of treatment) Speedy was able to participate in 3 sessions, had a bout of cancer and this was a challenge to his own mortality    Treatment Complications (if any): Sees asking for help as a weakness    Treatment Progress: fair    Current [facility] Psychiatric Provider: None    Discharge Medications include: See Chart    Discharge Date: 10/25/2024    Discharge Diagnosis:   1. Generalized anxiety disorder            Criteria for Discharge: demonstrated failure to uphold their treatment plan/contract    Patient is cleared to return to [facility] for continued treatment.    Rationale: Patient needs to be ready to engage in treatment and may feel comfortable with this writer as he has seen her previously    Aftercare recommendations include (include specific referral names and phone numbers, if appropriate): None    Prognosis: fair

## 2025-03-20 ENCOUNTER — ANESTHESIA EVENT (OUTPATIENT)
Dept: GASTROENTEROLOGY | Facility: HOSPITAL | Age: 65
End: 2025-03-20
Payer: COMMERCIAL

## 2025-03-20 ENCOUNTER — ANESTHESIA (OUTPATIENT)
Dept: GASTROENTEROLOGY | Facility: HOSPITAL | Age: 65
End: 2025-03-20
Payer: COMMERCIAL

## 2025-03-20 ENCOUNTER — HOSPITAL ENCOUNTER (OUTPATIENT)
Dept: GASTROENTEROLOGY | Facility: HOSPITAL | Age: 65
Setting detail: OUTPATIENT SURGERY
Discharge: HOME | End: 2025-03-20
Payer: COMMERCIAL

## 2025-03-20 VITALS
OXYGEN SATURATION: 97 % | DIASTOLIC BLOOD PRESSURE: 80 MMHG | BODY MASS INDEX: 30.98 KG/M2 | HEART RATE: 72 BPM | WEIGHT: 241.4 LBS | SYSTOLIC BLOOD PRESSURE: 130 MMHG | HEIGHT: 74 IN | RESPIRATION RATE: 16 BRPM | TEMPERATURE: 98.6 F

## 2025-03-20 DIAGNOSIS — Z86.0100 PERSONAL HISTORY OF COLON POLYPS, UNSPECIFIED: ICD-10-CM

## 2025-03-20 DIAGNOSIS — Z12.11 SCREENING FOR COLON CANCER: ICD-10-CM

## 2025-03-20 PROCEDURE — 45385 COLONOSCOPY W/LESION REMOVAL: CPT | Performed by: INTERNAL MEDICINE

## 2025-03-20 PROCEDURE — 88305 TISSUE EXAM BY PATHOLOGIST: CPT | Performed by: PATHOLOGY

## 2025-03-20 RX ORDER — SODIUM CHLORIDE, SODIUM LACTATE, POTASSIUM CHLORIDE, CALCIUM CHLORIDE 600; 310; 30; 20 MG/100ML; MG/100ML; MG/100ML; MG/100ML
INJECTION, SOLUTION INTRAVENOUS CONTINUOUS PRN
Status: DISCONTINUED | OUTPATIENT
Start: 2025-03-20 | End: 2025-03-20

## 2025-03-20 RX ORDER — SODIUM CHLORIDE, SODIUM LACTATE, POTASSIUM CHLORIDE, CALCIUM CHLORIDE 600; 310; 30; 20 MG/100ML; MG/100ML; MG/100ML; MG/100ML
50 INJECTION, SOLUTION INTRAVENOUS CONTINUOUS
Status: DISCONTINUED | OUTPATIENT
Start: 2025-03-20 | End: 2025-03-24 | Stop reason: HOSPADM

## 2025-03-20 RX ORDER — LIDOCAINE HYDROCHLORIDE 10 MG/ML
INJECTION, SOLUTION EPIDURAL; INFILTRATION; INTRACAUDAL; PERINEURAL AS NEEDED
Status: DISCONTINUED | OUTPATIENT
Start: 2025-03-20 | End: 2025-03-20

## 2025-03-20 RX ORDER — PROPOFOL 10 MG/ML
INJECTION, EMULSION INTRAVENOUS AS NEEDED
Status: DISCONTINUED | OUTPATIENT
Start: 2025-03-20 | End: 2025-03-20

## 2025-03-20 RX ADMIN — PROPOFOL 100 MG: 10 INJECTION, EMULSION INTRAVENOUS at 11:00

## 2025-03-20 RX ADMIN — PROPOFOL 150 MG: 10 INJECTION, EMULSION INTRAVENOUS at 10:54

## 2025-03-20 RX ADMIN — LIDOCAINE HYDROCHLORIDE 50 MG: 10 INJECTION, SOLUTION EPIDURAL; INFILTRATION; INTRACAUDAL; PERINEURAL at 10:54

## 2025-03-20 RX ADMIN — SODIUM CHLORIDE, SODIUM LACTATE, POTASSIUM CHLORIDE, AND CALCIUM CHLORIDE: .6; .31; .03; .02 INJECTION, SOLUTION INTRAVENOUS at 10:49

## 2025-03-20 RX ADMIN — PROPOFOL 50 MG: 10 INJECTION, EMULSION INTRAVENOUS at 11:03

## 2025-03-20 RX ADMIN — SODIUM CHLORIDE, SODIUM LACTATE, POTASSIUM CHLORIDE, AND CALCIUM CHLORIDE 50 ML/HR: .6; .31; .03; .02 INJECTION, SOLUTION INTRAVENOUS at 09:39

## 2025-03-20 NOTE — INTERVAL H&P NOTE
H&P reviewed. After examining the patient I find no changes in the patients condition since the H&P had been written.    Vitals:    03/20/25 0930   BP: 122/73   Pulse: 76   Resp: 15   Temp: 97.8 °F (36.6 °C)   SpO2: 95%

## 2025-03-20 NOTE — ANESTHESIA PREPROCEDURE EVALUATION
Procedure:  COLONOSCOPY    Relevant Problems   /RENAL   (+) Prostate cancer (HCC)      MUSCULOSKELETAL   (+) Gout      NEURO/PSYCH   (+) Generalized anxiety disorder      PULMONARY   (+) Obstructive sleep apnea        Physical Exam    Airway    Mallampati score: IV  TM Distance: <3 FB  Neck ROM: full     Dental   No notable dental hx     Cardiovascular  Rhythm: regular, No weak pulses    Pulmonary   No stridor    Other Findings        Anesthesia Plan  ASA Score- 2     Anesthesia Type- IV sedation with anesthesia with ASA Monitors.         Additional Monitors:     Airway Plan:            Plan Factors-    Chart reviewed.   Existing labs reviewed. Patient summary reviewed.                  Induction-     Postoperative Plan- Plan for postoperative opioid use. Planned trial extubation    Perioperative Resuscitation Plan - Level 1 - Full Code.       Informed Consent- Anesthetic plan and risks discussed with patient.  I personally reviewed this patient with the CRNA. Discussed and agreed on the Anesthesia Plan with the CRNA..      NPO Status:  Vitals Value Taken Time   Date of last liquid 03/20/25 03/20/25 0931   Time of last liquid 0330 03/20/25 0931   Date of last solid 03/18/25 03/20/25 0931   Time of last solid 1900 03/20/25 0931

## 2025-03-20 NOTE — ANESTHESIA POSTPROCEDURE EVALUATION
Post-Op Assessment Note    CV Status:  Stable  Pain Score: 0    Pain management: adequate       Mental Status:  Sleepy   Hydration Status:  Stable   PONV Controlled:  None   Airway Patency:  Patent     Post Op Vitals Reviewed: Yes    No anethesia notable event occurred.    Staff: CRNA           Last Filed PACU Vitals:  Vitals Value Taken Time   Temp 98    Pulse 67    /70    Resp 16    SpO2 98        Modified Millie:     Vitals Value Taken Time   Activity 2 03/20/25 1110   Respiration 2 03/20/25 1110   Circulation 2 03/20/25 1110   Consciousness 1 03/20/25 1110   Oxygen Saturation 2 03/20/25 1110     Modified Millie Score: 9

## 2025-03-26 ENCOUNTER — RESULTS FOLLOW-UP (OUTPATIENT)
Dept: GASTROENTEROLOGY | Facility: CLINIC | Age: 65
End: 2025-03-26

## 2025-03-26 PROCEDURE — 88305 TISSUE EXAM BY PATHOLOGIST: CPT | Performed by: PATHOLOGY

## 2025-05-01 ENCOUNTER — OFFICE VISIT (OUTPATIENT)
Dept: FAMILY MEDICINE CLINIC | Facility: CLINIC | Age: 65
End: 2025-05-01
Payer: COMMERCIAL

## 2025-05-01 ENCOUNTER — APPOINTMENT (OUTPATIENT)
Dept: LAB | Facility: CLINIC | Age: 65
End: 2025-05-01
Payer: COMMERCIAL

## 2025-05-01 VITALS
SYSTOLIC BLOOD PRESSURE: 146 MMHG | WEIGHT: 260 LBS | BODY MASS INDEX: 33.37 KG/M2 | DIASTOLIC BLOOD PRESSURE: 76 MMHG | HEART RATE: 88 BPM | TEMPERATURE: 98.2 F | OXYGEN SATURATION: 96 % | HEIGHT: 74 IN

## 2025-05-01 DIAGNOSIS — M25.50 ARTHRALGIA, UNSPECIFIED JOINT: ICD-10-CM

## 2025-05-01 DIAGNOSIS — M79.10 MYALGIA: ICD-10-CM

## 2025-05-01 DIAGNOSIS — R53.81 MALAISE: ICD-10-CM

## 2025-05-01 DIAGNOSIS — C61 PROSTATE CANCER (HCC): ICD-10-CM

## 2025-05-01 DIAGNOSIS — M25.50 ARTHRALGIA, UNSPECIFIED JOINT: Primary | ICD-10-CM

## 2025-05-01 PROCEDURE — 99213 OFFICE O/P EST LOW 20 MIN: CPT | Performed by: FAMILY MEDICINE

## 2025-05-01 PROCEDURE — 86666 EHRLICHIA ANTIBODY: CPT

## 2025-05-01 PROCEDURE — 36415 COLL VENOUS BLD VENIPUNCTURE: CPT

## 2025-05-01 PROCEDURE — 86618 LYME DISEASE ANTIBODY: CPT

## 2025-05-01 NOTE — PROGRESS NOTES
"Name: James D Apgar      : 1960      MRN: [id number]  Encounter Provider: Trinidad Arias DO  Encounter Date: 2025   Encounter department: [facility]  :  Assessment & Plan  Arthralgia, unspecified joint  Will check Lyme and Anaplasmosis testing   Orders:  •  Lyme Total AB W Reflex to IGM/IGG; Future  •  Anaplasma phagocytophilum Antibodies (IgG,IgM); Future    Myalgia    Orders:  •  Lyme Total AB W Reflex to IGM/IGG; Future  •  Anaplasma phagocytophilum Antibodies (IgG,IgM); Future    Malaise    Orders:  •  Lyme Total AB W Reflex to IGM/IGG; Future  •  Anaplasma phagocytophilum Antibodies (IgG,IgM); Future    Prostate cancer (HCC)  S/p prostatectomy, follows regularly with Urology (next )               History of Present Illness   HPI    Pt presents due concern for Lyme     Has had Lyme infection previously and feels similar symptoms over the past 2 weeks -- joint/muscle pain, subjective fever, malaise     His dog was also recently diagnosed with anaplasmosis     Review of Systems   Constitutional:  Positive for fatigue and fever.   HENT:  Negative for congestion, ear pain, rhinorrhea and sore throat.    Respiratory:  Negative for cough and shortness of breath.    Musculoskeletal:  Positive for arthralgias and myalgias.   Skin:  Negative for rash.       Objective   /76   Pulse 88   Temp 98.2 °F (36.8 °C)   Ht 6' 2\" (1.88 m)   Wt 118 kg (260 lb)   SpO2 96%   BMI 33.38 kg/m²      Physical Exam  Vitals and nursing note reviewed.   Constitutional:       General: He is not in acute distress.     Appearance: Normal appearance.   Pulmonary:      Effort: Pulmonary effort is normal. No respiratory distress.   Neurological:      Mental Status: He is alert.      Comments: Grossly intact   Psychiatric:         Mood and Affect: Mood normal.         "

## 2025-05-02 LAB — B BURGDOR IGG+IGM SER QL IA: NEGATIVE

## 2025-05-04 ENCOUNTER — RESULTS FOLLOW-UP (OUTPATIENT)
Dept: FAMILY MEDICINE CLINIC | Facility: CLINIC | Age: 65
End: 2025-05-04

## 2025-05-05 LAB
A PHAGOCYTOPH IGG TITR SER IF: NORMAL {TITER}
A PHAGOCYTOPH IGM TITR SER IF: NORMAL {TITER}
LABORATORY REPORT: NORMAL
MICRODELETION SYND BLD/T FISH: NORMAL

## 2025-05-08 ENCOUNTER — OFFICE VISIT (OUTPATIENT)
Dept: UROLOGY | Facility: CLINIC | Age: 65
End: 2025-05-08
Payer: COMMERCIAL

## 2025-05-08 VITALS
OXYGEN SATURATION: 96 % | HEIGHT: 74 IN | HEART RATE: 81 BPM | BODY MASS INDEX: 32.08 KG/M2 | WEIGHT: 250 LBS | DIASTOLIC BLOOD PRESSURE: 82 MMHG | SYSTOLIC BLOOD PRESSURE: 136 MMHG

## 2025-05-08 DIAGNOSIS — C61 PROSTATE CANCER (HCC): Primary | ICD-10-CM

## 2025-05-08 PROCEDURE — 99213 OFFICE O/P EST LOW 20 MIN: CPT | Performed by: PHYSICIAN ASSISTANT

## 2025-05-08 NOTE — PROGRESS NOTES
"Name: James D Apgar      : 1960      MRN: [id number]  Encounter Provider: Joe Tatum PA-C  Encounter Date: 2025   Encounter department: [facility]  :  Assessment & Plan  Prostate cancer (HCC)    Orders:    PSA Total, Diagnostic; Future  Follow-up in 1 year with PSA prior to visit  Trimix reordered to [place] [facility]    History of Present Illness   James D Apgar is a 65 y.o. male who presents history of Kinjal 7 stage II prostate cancer.  He had a robotic prostatectomy in .  He has good urinary control he uses Trimix successfully for his ED.  PSA remains undetectable.  <0.008.  AUA SYMPTOM SCORE      Flowsheet Row Most Recent Value   AUA SYMPTOM SCORE    How often have you had a sensation of not emptying your bladder completely after you finished urinating? 1 (P)     How often have you had to urinate again less than two hours after you finished urinating? 1 (P)     How often have you found you stopped and started again several times when you urinate? 0 (P)     How often have you found it difficult to postpone urination? 0 (P)     How often have you had a weak urinary stream? 0 (P)     How often have you had to push or strain to begin urination? 0 (P)     How many times did you most typically get up to urinate from the time you went to bed at night until the time you got up in the morning? 2 (P)     Quality of Life: If you were to spend the rest of your life with your urinary condition just the way it is now, how would you feel about that? 2 (P)     AUA SYMPTOM SCORE 4 (P)            Review of Systems       Objective   /82 (BP Location: Left arm, Patient Position: Sitting, Cuff Size: Standard)   Pulse 81   Ht 6' 2\" (1.88 m)   Wt 113 kg (250 lb)   SpO2 96%   BMI 32.10 kg/m²     Physical Exam GEN: no acute distress    RESP: breathing comfortably with no accessory muscle use    ABD: soft, non-tender, non-distended   INCISION:    EXT: no significant " peripheral edema       RADIOLOGY:   None       Results   Lab Results   Component Value Date    PSA <0.008 02/13/2025    PSA <0.008 06/13/2024    PSA <0.01 01/23/2024     Lab Results   Component Value Date    CALCIUM 9.3 08/20/2024    K 4.3 08/20/2024    CO2 24 08/20/2024     08/20/2024    BUN 18 08/20/2024    CREATININE 0.87 08/20/2024     Lab Results   Component Value Date    WBC 13.84 (H) 06/28/2023    HGB 15.0 06/28/2023    HCT 44.5 06/28/2023    MCV 92 06/28/2023     06/28/2023       Office Urine Dip  No results found for this or any previous visit (from the past hour).

## 2025-07-18 ENCOUNTER — OFFICE VISIT (OUTPATIENT)
Dept: PODIATRY | Facility: CLINIC | Age: 65
End: 2025-07-18
Payer: COMMERCIAL

## 2025-07-18 VITALS — WEIGHT: 250 LBS | HEIGHT: 74 IN | BODY MASS INDEX: 32.08 KG/M2

## 2025-07-18 DIAGNOSIS — L03.039 PARONYCHIA OF GREAT TOE: Primary | ICD-10-CM

## 2025-07-18 DIAGNOSIS — M79.674 GREAT TOE PAIN, RIGHT: ICD-10-CM

## 2025-07-18 PROCEDURE — 11730 AVULSION NAIL PLATE SIMPLE 1: CPT

## 2025-07-18 PROCEDURE — 99203 OFFICE O/P NEW LOW 30 MIN: CPT

## 2025-07-18 NOTE — PROGRESS NOTES
"Name: James D Apgar      : 1960      MRN: [id number]  Encounter Provider: Maciej Gastelum DPM  Encounter Date: 2025   Encounter department: [facility] [place]  :  Assessment & Plan  Paronychia of great toe         Great toe pain, right           Plan:  Diagnosis and options discussed with patient.  Patient agreeable to the plan as stated below.  Partial nail avulsion procedure performed, see procedure note.   Patient is to soak in warm water daily starting tomorrow followed by triple antibiotic/Aquafor/Vaseline dressing.   If there are any acute signs of infection (redness, swelling, purulent drainage, fever, chills), patient was instructed to go to the emergency department for evaluation.  Follow up in 2-3 weeks for recheck.    Nail removal    Date/Time: 2025 9:00 AM    Performed by: Maciej Gastelum DPM  Authorized by: Maciej Gastelum DPM    Patient location:  Clinic  Indications / Diagnosis:  Ingrown nail    Universal Protocol:  procedure performed by consultantConsent: Verbal consent obtained  Risks and benefits: risks, benefits and alternatives were discussed  Consent given by: patient  Time out: Immediately prior to procedure a \"time out\" was called to verify the correct patient, procedure, equipment, support staff and site/side marked as required.  Timeout called at: 2025 9:07 AM.  Patient understanding: patient states understanding of the procedure being performed    Location:     Foot:  R big toe  Pre-procedure details:     Skin preparation:  Betadine    Preparation: Patient was prepped and draped in the usual sterile fashion    Anesthesia (see MAR for exact dosages):     Anesthesia method:  Nerve block    Block needle gauge:  25 G    Block anesthetic:  Lidocaine 1% w/o epi    Block technique:  Digital Block    Block outcome:  Anesthesia achieved  Nail Removal:     Nail removed:  Partial    Nail bed sutured: no    Post-procedure details:     Dressing:  4x4 sterile gauze, " "antibiotic ointment, gauze roll and petrolatum-impregnated gauze    Patient tolerance of procedure:  Tolerated well, no immediate complications  Comments:      Discussion with patient was completed today regarding diagnosis and potential etiologies as well as treatment options for this ingrown nail diagnosis.  Discussed how to avoid recurrence and possible treatment options if recurrence does occur.    Antibiotic ointment applied to border with bandage dressing  Pt instructed to keep dressing intact for 24 hours.  After this time use triple antibiotic ointment to the area and a dry dressing changed daily  Return to clinic in about 3 weeks for reevaluation.  If ingrown nail recurs can consider use of phenol at nail matrix.  If notice any redness, swelling, drainage, or excessive pain or signs of infection to notify the office sooner.  Procedure completed without incident.  Do not soak foot.    Procedure in detail: Once the toe was anesthetized, the area was scrubbed and prepped with sterile technique.  A hemostat was used to lift up the involved border of the great toe.  Care was taken to protect the underlying nail bed.  An English anvil was used to cut back corner to the base of the nail.  A hemostat was then used to remove the nail that was ingrown.  This was then checked that the entire ingrown portion was removed.  This was removed from the operative area.  Hemostasis was achieved and dressings were applied.           History of Present Illness   HPI  James D Apgar is a 65 y.o. male who presents painful ingrowing nail right great toe.  He reports redness swelling and some clear drainage over the past week.  He reports he has had not had an ingrown this bad before.  He denies any recent fever, chills or malaise.  History obtained from: patient    Review of Systems  Medications Ordered Prior to Encounter[1]      Objective   Ht 6' 2\" (1.88 m)   Wt 113 kg (250 lb)   BMI 32.10 kg/m²      Physical Exam    Pain on " palpation noted to the right hallux nail border.  There is discomfort with medial lateral squeeze at the level of the great toe.  Intact pedal pulses.  Neurological sensation is grossly intact distally. No tenderness other areas of foot or ankle. No other open lesions or ulcerations noted currently.        [1]   Current Outpatient Medications on File Prior to Visit   Medication Sig Dispense Refill    allopurinol (ZYLOPRIM) 100 mg tablet Take 100 mg by mouth daily      colchicine (COLCRYS) 0.6 mg tablet START WITH 2 TABLETS BY MOUTH THEN REPEAT 1 TABLET IN ONE HOUR (Patient taking differently: daily as needed)      Multiple Vitamin (MULTIVITAMIN) capsule Take by mouth daily      Papav-Phentolamine-Alprostadil (PGE1 / PHENTOLAMINE / PAPAVERINE, STANDARD TRIMIX, 20 MCG / 1 MG / 30 MG INJECTION) by Intracavernosal route once       No current facility-administered medications on file prior to visit.

## 2025-08-07 ENCOUNTER — OFFICE VISIT (OUTPATIENT)
Dept: GASTROENTEROLOGY | Facility: CLINIC | Age: 65
End: 2025-08-07
Payer: COMMERCIAL

## 2025-08-07 VITALS
WEIGHT: 251 LBS | TEMPERATURE: 97.6 F | BODY MASS INDEX: 32.21 KG/M2 | HEIGHT: 74 IN | SYSTOLIC BLOOD PRESSURE: 118 MMHG | HEART RATE: 79 BPM | OXYGEN SATURATION: 94 % | DIASTOLIC BLOOD PRESSURE: 64 MMHG

## 2025-08-07 DIAGNOSIS — R14.0 BLOATING: ICD-10-CM

## 2025-08-07 DIAGNOSIS — R10.32 BILATERAL LOWER ABDOMINAL CRAMPING: ICD-10-CM

## 2025-08-07 DIAGNOSIS — R19.8 IRREGULAR BOWEL HABITS: Primary | ICD-10-CM

## 2025-08-07 DIAGNOSIS — R10.31 BILATERAL LOWER ABDOMINAL CRAMPING: ICD-10-CM

## 2025-08-07 PROCEDURE — 99213 OFFICE O/P EST LOW 20 MIN: CPT | Performed by: PHYSICIAN ASSISTANT

## 2025-08-15 ENCOUNTER — OFFICE VISIT (OUTPATIENT)
Dept: UROLOGY | Facility: CLINIC | Age: 65
End: 2025-08-15
Payer: COMMERCIAL

## (undated) DEVICE — STERILE SURGICAL LUBRICANT,  TUBE: Brand: SURGILUBE

## (undated) DEVICE — COLUMN DRAPE

## (undated) DEVICE — CANNULA SEAL

## (undated) DEVICE — RAZOR DBLE EDGE SHAVER

## (undated) DEVICE — GAUZE SPONGES,16 PLY: Brand: CURITY

## (undated) DEVICE — TROCAR PORT ACCESS 12 X120MM W/BLDLS OPTICAL TIP AIRSEAL

## (undated) DEVICE — SPECIMEN CONTAINER STERILE PEEL PACK

## (undated) DEVICE — DRAIN SPONGES,6 PLY: Brand: EXCILON

## (undated) DEVICE — GLOVE SRG BIOGEL ECLIPSE 7.5

## (undated) DEVICE — TROCAR: Brand: KII FIOS FIRST ENTRY

## (undated) DEVICE — JACKSON-PRATT 100CC BULB RESERVOIR: Brand: CARDINAL HEALTH

## (undated) DEVICE — KIT, BETHLEHEM ROBOTIC PROST: Brand: CARDINAL HEALTH

## (undated) DEVICE — ASTOUND STANDARD SURGICAL GOWN, XL: Brand: CONVERTORS

## (undated) DEVICE — SURGICEL 4 X 8

## (undated) DEVICE — KERLIX BANDAGE ROLL: Brand: KERLIX

## (undated) DEVICE — PROGRASP FORCEPS: Brand: ENDOWRIST

## (undated) DEVICE — DRAPE SHEET THREE QUARTER

## (undated) DEVICE — SUT PDS II 0 CT-1 27 IN Z340H

## (undated) DEVICE — SYRINGE 10ML LL

## (undated) DEVICE — NEEDLE 25G X 1 1/2

## (undated) DEVICE — PREP PAD BNS: Brand: CONVERTORS

## (undated) DEVICE — SURGIFLO ENDOSCOPIC APPICATOR: Brand: ETHICON

## (undated) DEVICE — CHLORHEXIDINE 4PCT 4 OZ

## (undated) DEVICE — SYSTEM TRANSPERINEAL ACCESS PRECISIONPOINT

## (undated) DEVICE — TIP COVER ACCESSORY

## (undated) DEVICE — HEAVY DUTY TABLE COVER: Brand: CONVERTORS

## (undated) DEVICE — PREMIUM DRY TRAY LF: Brand: MEDLINE INDUSTRIES, INC.

## (undated) DEVICE — MAX-CORE® DISPOSABLE CORE BIOPSY INSTRUMENT, 18G X 25CM: Brand: MAX-CORE

## (undated) DEVICE — LARGE NEEDLE DRIVER: Brand: ENDOWRIST

## (undated) DEVICE — BAG URINE DRAINAGE URIMETER 350ML LF

## (undated) DEVICE — SUT VICRYL 0 UR-6 27 IN J603H

## (undated) DEVICE — GLOVE INDICATOR PI UNDERGLOVE SZ 8 BLUE

## (undated) DEVICE — 3M™ IOBAN™ 2 ANTIMICROBIAL INCISE DRAPE 6640EZ: Brand: IOBAN™ 2

## (undated) DEVICE — ULTRASOUND GEL STERILE FOIL PK

## (undated) DEVICE — VISUALIZATION SYSTEM: Brand: CLEARIFY

## (undated) DEVICE — CATH FOLEY COUNCIL 20FR 5ML 2 WAY LUBRICATH

## (undated) DEVICE — HEMOSTATIC MATRIX SURGIFLO 8ML W/THROMBIN

## (undated) DEVICE — ARM DRAPE

## (undated) DEVICE — ENDOPATH PNEUMONEEDLE INSUFFLATION NEEDLES WITH LUER LOCK CONNECTORS 120MM: Brand: ENDOPATH

## (undated) DEVICE — TISSUE RETRIEVAL SYSTEM: Brand: INZII RETRIEVAL SYSTEM

## (undated) DEVICE — 40595 XL TRENDELENBURG POSITIONING KIT: Brand: 40595 XL TRENDELENBURG POSITIONING KIT

## (undated) DEVICE — INTENDED FOR TISSUE SEPARATION, AND OTHER PROCEDURES THAT REQUIRE A SHARP SURGICAL BLADE TO PUNCTURE OR CUT.: Brand: BARD-PARKER SAFETY BLADES SIZE 11, STERILE

## (undated) DEVICE — JP CHANNEL DRAIN 19FR, FULL FLUTES: Brand: JACKSON-PRATT

## (undated) DEVICE — MARYLAND BIPOLAR FORCEPS: Brand: ENDOWRIST

## (undated) DEVICE — HEM-O-LOK CLIP CARTRIDGE LARGE DA VINCI SI/XI

## (undated) DEVICE — SUT STRATAFIX SPIRAL MONOCRYL PLUS 3-0 RB-1 23CM SXMP1B438

## (undated) DEVICE — ULNAR NERVE PROTECTOR FOAM POSITIONER: Brand: CARDINAL HEALTH

## (undated) DEVICE — ADHESIVE SKIN HIGH VISCOSITY EXOFIN 1ML

## (undated) DEVICE — CHLORAPREP HI-LITE 26ML ORANGE

## (undated) DEVICE — SUT SILK 0 FSL 18 IN 678G

## (undated) DEVICE — SUT MONOCRYL 4-0 PS-2 27 IN Y426H

## (undated) DEVICE — DECANTER: Brand: UNBRANDED

## (undated) DEVICE — BLADELESS OBTURATOR: Brand: WECK VISTA

## (undated) DEVICE — TELFA NON-ADHERENT ABSORBENT DRESSING: Brand: TELFA

## (undated) DEVICE — UTILITY MARKER,BLACK WITH LABELS: Brand: DEVON

## (undated) DEVICE — AIRSEAL TUBE SMOKE EVAC LUMENX3 FILTERED

## (undated) DEVICE — SUT VICRYL 2-0 CT-1 27 IN J259H

## (undated) DEVICE — CATH FOLEY COUNCIL 18FR 5ML 2 WAY LUBRICATH

## (undated) DEVICE — MONOPOLAR CURVED SCISSORS: Brand: ENDOWRIST